# Patient Record
Sex: MALE | Race: WHITE | NOT HISPANIC OR LATINO | Employment: OTHER | URBAN - METROPOLITAN AREA
[De-identification: names, ages, dates, MRNs, and addresses within clinical notes are randomized per-mention and may not be internally consistent; named-entity substitution may affect disease eponyms.]

---

## 2017-02-02 ENCOUNTER — LAB VISIT (OUTPATIENT)
Dept: LAB | Facility: HOSPITAL | Age: 57
End: 2017-02-02
Attending: UROLOGY
Payer: COMMERCIAL

## 2017-02-02 DIAGNOSIS — N40.1 BPH (BENIGN PROSTATIC HYPERTROPHY) WITH URINARY OBSTRUCTION: ICD-10-CM

## 2017-02-02 DIAGNOSIS — N13.8 BPH (BENIGN PROSTATIC HYPERTROPHY) WITH URINARY OBSTRUCTION: ICD-10-CM

## 2017-02-02 LAB — COMPLEXED PSA SERPL-MCNC: 6.2 NG/ML

## 2017-02-02 PROCEDURE — 36415 COLL VENOUS BLD VENIPUNCTURE: CPT | Mod: PO

## 2017-02-02 PROCEDURE — 84153 ASSAY OF PSA TOTAL: CPT

## 2017-02-06 ENCOUNTER — OFFICE VISIT (OUTPATIENT)
Dept: UROLOGY | Facility: CLINIC | Age: 57
End: 2017-02-06
Payer: COMMERCIAL

## 2017-02-06 VITALS
HEIGHT: 77 IN | WEIGHT: 264.56 LBS | BODY MASS INDEX: 31.24 KG/M2 | SYSTOLIC BLOOD PRESSURE: 129 MMHG | HEART RATE: 45 BPM | DIASTOLIC BLOOD PRESSURE: 81 MMHG

## 2017-02-06 DIAGNOSIS — N13.8 BPH (BENIGN PROSTATIC HYPERTROPHY) WITH URINARY OBSTRUCTION: ICD-10-CM

## 2017-02-06 DIAGNOSIS — R97.20 ELEVATED PSA: Primary | ICD-10-CM

## 2017-02-06 DIAGNOSIS — N52.9 ED (ERECTILE DYSFUNCTION) OF ORGANIC ORIGIN: ICD-10-CM

## 2017-02-06 DIAGNOSIS — N40.1 BPH (BENIGN PROSTATIC HYPERTROPHY) WITH URINARY OBSTRUCTION: ICD-10-CM

## 2017-02-06 PROCEDURE — 99999 PR PBB SHADOW E&M-EST. PATIENT-LVL III: CPT | Mod: PBBFAC,,, | Performed by: UROLOGY

## 2017-02-06 PROCEDURE — 99214 OFFICE O/P EST MOD 30 MIN: CPT | Mod: S$GLB,,, | Performed by: UROLOGY

## 2017-02-06 NOTE — MR AVS SNAPSHOT
[place] [facility]   [facility]  [place] [address]  Phone: [phone]                  Dmitri Watt   2017 8:40 AM   Office Visit    Description:  Male : 1960   Provider:  Raheel Ochoa MD   Department:  [place] [facility]           Reason for Visit     Follow-up           Diagnoses this Visit        Comments    Elevated PSA    -  Primary     BPH (benign prostatic hypertrophy) with urinary obstruction         ED (erectile dysfunction) of organic origin                To Do List           Goals (5 Years of Data)     None      Follow-Up and Disposition     Return for MRI of prostate, PSA in 6 months if negative.      [facility]     [facility] On Call Nurse Care Line -  Assistance  Registered nurses in the [facility] On Call Center provide clinical advisement, health education, appointment booking, and other advisory services.  Call for this free service at [phone].             Medications           Message regarding Medications     Verify the changes and/or additions to your medication regime listed below are the same as discussed with your clinician today.  If any of these changes or additions are incorrect, please notify your healthcare provider.             Verify that the below list of medications is an accurate representation of the medications you are currently taking.  If none reported, the list may be blank. If incorrect, please contact your healthcare provider. Carry this list with you in case of emergency.           Current Medications     finasteride (PROSCAR) 5 mg tablet Take 1 tablet (5 mg total) by mouth once daily.    glucosamine-chondroitin 500-400 mg tablet Take 1 tablet by mouth 3 (three) times daily.    ibuprofen (ADVIL,MOTRIN) 100 MG tablet Take 100 mg by mouth every 6 (six) hours as needed.    tamsulosin (FLOMAX) 0.4 mg Cp24 Take 1 capsule (0.4 mg total) by mouth every evening.    VIAGRA 100 mg tablet TAKE 1 TABLET BY MOUTH EVERY DAY          "  Clinical Reference Information           Your Vitals Were     BP Pulse Height Weight BMI    129/81 45 6' 5" (1.956 m) 120 kg (264 lb 8.8 oz) 31.37 kg/m2      Blood Pressure          Most Recent Value    BP  129/81      Allergies as of 2/6/2017     No Known Drug Allergies      Immunizations Administered on Date of Encounter - 2/6/2017     None      Orders Placed During Today's Visit     Future Labs/Procedures Expected by Expires    MRI Pelvis W WO Contrast  2/6/2017 2/6/2018    Prostate Specific Antigen, Diagnostic  2/6/2017 2/6/2018      [facility] Sign-Up     Activating your [facility] account is as easy as 1-2-3!     1) Visit [email or web address], select Sign Up Now, enter this activation code and your date of birth, then select Next.  [email or web address]  Expires: 3/23/2017  9:21 AM      2) Create a username and password to use when you visit [facility] in the future and select a security question in case you lose your password and select Next.    3) Enter your e-mail address and click Sign Up!    Additional Information  If you have questions, please e-mail [email or web address] or call [phone] to talk to our [facility] staff. Remember, [facility] is NOT to be used for urgent needs. For medical emergencies, dial 911.         Language Assistance Services     ATTENTION: Language assistance services are available, free of charge. Please call [phone].      ATENCIÓN: Si habla español, tiene a valenzuela disposición servicios gratuitos de asistencia lingüística. Llame al [phone].     CHÚ Ý: N?u b?n nói Ti?ng Vi?t, có các d?ch v? h? tr? ngôn ng? mi?n phí dành cho b?n. G?i s? [phone].         [facility] complies with applicable Federal civil rights laws and does not discriminate on the basis of race, color, national origin, age, disability, or sex.        "

## 2017-02-06 NOTE — PROGRESS NOTES
CC: PSA follow up.    Dmitri Watt is a 56 y.o. man who is here for the evaluation of elevated PSA.    his severe constipation has improved from diet change.    s/p TRUS bx revealing no prostate cancer but enlarged prostate of 63 grams in size back in 4/2014.  Has been on Flomax and Finasteride since then.  Noticed improvement on his urinary symptoms more so lately since the medical trial.  However, he has experienced decreased sexual drive, erectile function and ejaculation disorder.  His wife underwent removal of ovary related to breast cancer and the couple experienced less frequent sexual encounter.  He used to use a small fragment of viagra to enhance his erectile function.  But now since using flomax and finasteride, he has to use much higher dose of viagra.    His friend was diagnosed with prostate cancer and was treated locally here in [place].  He is concerned about his elevated PSA.    Past Medical History   Diagnosis Date    Elevated PSA      Past Surgical History   Procedure Laterality Date    None       Social History   Substance Use Topics    Smoking status: Never Smoker    Smokeless tobacco: Never Used    Alcohol use Yes      Comment: socially     Family History   Problem Relation Age of Onset    Cancer Maternal Aunt      colon     Allergy:  Review of patient's allergies indicates:   Allergen Reactions    No known drug allergies      Outpatient Encounter Prescriptions as of 2/6/2017   Medication Sig Dispense Refill    finasteride (PROSCAR) 5 mg tablet Take 1 tablet (5 mg total) by mouth once daily. 90 tablet 3    glucosamine-chondroitin 500-400 mg tablet Take 1 tablet by mouth 3 (three) times daily.      ibuprofen (ADVIL,MOTRIN) 100 MG tablet Take 100 mg by mouth every 6 (six) hours as needed.      tamsulosin (FLOMAX) 0.4 mg Cp24 Take 1 capsule (0.4 mg total) by mouth every evening. 90 capsule 3    VIAGRA 100 mg tablet TAKE 1 TABLET BY MOUTH EVERY DAY 5 tablet 11     No  facility-administered encounter medications on file as of 2/6/2017.      Review of Systems   General ROS: GENERAL:  No weight gain or loss  SKIN:  No rashes or lacerations  HEAD:  No headaches  EYES:  No exophthalmos, jaundice or blindness  EARS:  No dizziness, tinnitus or hearing loss  NOSE:  No changes in smell  MOUTH & THROAT:  No dyskinetic movements or obvious goiter  CHEST:  No shortness of breath, hyperventilation or cough  CARDIOVASCULAR:  No tachycardia or chest pain  ABDOMEN:  No nausea, vomiting, pain, constipation or diarrhea  URINARY:  No frequency, dysuria or sexual dysfunction  ENDOCRINE:  No polydipsia, polyuria  MUSCULOSKELETAL:  No pain or stiffness of the joints  NEUROLOGIC:  No weakness, sensory changes, seizures, confusion, memory loss, tremor or other abnormal movements  Physical Exam     Vitals:    02/06/17 0857   BP: 129/81   Pulse: (!) 45     General Appearance:  Alert, cooperative, no distress, appears stated age   Head:  Normocephalic, without obvious abnormality, atraumatic   Eyes:  PERRL, conjunctiva/corneas clear, EOM's intact, fundi benign, both eyes   Ears:  Normal TM's and external ear canals, both ears   Nose: Nares normal, septum midline, mucosa normal, no drainage or sinus tenderness   Throat: Lips, mucosa, and tongue normal; teeth and gums normal   Neck: Supple, symmetrical, trachea midline, no adenopathy, thyroid: not enlarged, symmetric, no tenderness/mass/nodules, no carotid bruit or JVD   Back:   Symmetric, no curvature, ROM normal, no CVA tenderness   Lungs:   Clear to auscultation bilaterally, respirations unlabored   Chest Wall:  No tenderness or deformity   Heart:  Regular rate and rhythm, S1, S2 normal, no murmur, rub or gallop   Abdomen:   Soft, non-tender, bowel sounds active all four quadrants,  no masses, no organomegaly of liver and spleen  No hernia noted   Genitalia:  Scrotum: no rash or lesion  Normal symmetric epididymis without masses  Normal vas  palpated  Normal size, symmetric testicles with no masses   Normal urethral meatus with no discharge  Normal circumcised penis with no lesion   Rectal:  Normal perineum and anus upon inspection.  Normal tone, no masses or tenderness;   Extremities: Extremities normal, atraumatic, no cyanosis or edema   Pulses: 2+ and symmetric   Skin: Skin color, texture, turgor normal, no rashes or lesions   Lymph nodes: Cervical, supraclavicular, and axillary nodes normal   Neurologic: Normal     Prostate 45 grams smooth with no nodule or tenderness.    LABS:  Lab Results   Component Value Date    PSA 6.3 (H) 11/08/2013    PSA 4.81 (H) 05/21/2012    PSA 4.72 (H) 03/09/2012    PSA 5.33 (H) 12/21/2011    PSA 2.9 01/21/2009    PSADIAG 6.2 (H) 02/02/2017    PSADIAG 8.7 (H) 12/05/2016    PSADIAG 6.4 (H) 12/29/2015    PSADIAG 4.5 (H) 06/01/2015    PSADIAG 4.0 11/04/2014    PSADIAG 5.6 (H) 01/27/2014     Results for orders placed or performed in visit on 12/29/15   Prostate Specific Antigen, Diagnostic   Result Value Ref Range    PSA DIAGNOSTIC 6.4 (H) 0.00-4.00 ng/mL   Results for orders placed or performed in visit on 06/01/15   Prostate Specific Antigen, Diagnostic   Result Value Ref Range    PSA DIAGNOSTIC 4.5 (H) 0.00-4.00 ng/mL   Results for orders placed or performed in visit on 11/04/14   Prostate Specific Antigen, Diagnostic   Result Value Ref Range    PSA DIAGNOSTIC 4.0 0.00-4.00 ng/mL     Lab Results   Component Value Date    CREATININE 1.3 11/04/2014    CREATININE 1.5 (H) 11/08/2013    CREATININE 1.3 12/21/2011     Results for orders placed or performed in visit on 11/04/14   Testosterone   Result Value Ref Range    Testosterone, Total 575 195.0-1138.0 ng/dL     Assessment and Plan:  Dmitri was seen today for follow-up.    Diagnoses and all orders for this visit:    Elevated PSA  -     MRI Pelvis W WO Contrast; Future  -     Prostate Specific Antigen, Diagnostic; Future    BPH (benign prostatic hypertrophy) with urinary  obstruction    ED (erectile dysfunction) of organic origin    wll do MRI of pelvis with prostate protocol.  If abnormal, will arrange another TRUS bx of prostate.  If no significant abnormality shows, will follow up with serial PSA.  Continue flomax and finasteride.    I spent 25 minutes with the patient of which more than half was spent in direct consultation with the patient in regards to our treatment and plan.    Follow-up:  Return for MRI of prostate, PSA in 6 months if negative.

## 2017-02-14 ENCOUNTER — TELEPHONE (OUTPATIENT)
Dept: UROLOGY | Facility: CLINIC | Age: 57
End: 2017-02-14

## 2017-02-15 ENCOUNTER — HOSPITAL ENCOUNTER (OUTPATIENT)
Dept: RADIOLOGY | Facility: HOSPITAL | Age: 57
Discharge: HOME | End: 2017-02-15
Attending: UROLOGY
Payer: COMMERCIAL

## 2017-02-15 ENCOUNTER — TELEPHONE (OUTPATIENT)
Dept: UROLOGY | Facility: CLINIC | Age: 57
End: 2017-02-15

## 2017-02-15 DIAGNOSIS — R97.20 ELEVATED PSA: Primary | ICD-10-CM

## 2017-02-15 DIAGNOSIS — R97.20 ELEVATED PSA: ICD-10-CM

## 2017-02-15 PROCEDURE — 72197 MRI PELVIS W/O & W/DYE: CPT | Mod: 26,GC,, | Performed by: RADIOLOGY

## 2017-02-15 PROCEDURE — 25500020 PHARM REV CODE 255: Performed by: UROLOGY

## 2017-02-15 PROCEDURE — 72197 MRI PELVIS W/O & W/DYE: CPT | Mod: TC

## 2017-02-15 PROCEDURE — A9585 GADOBUTROL INJECTION: HCPCS | Performed by: UROLOGY

## 2017-02-15 RX ORDER — GADOBUTROL 604.72 MG/ML
10 INJECTION INTRAVENOUS
Status: COMPLETED | OUTPATIENT
Start: 2017-02-15 | End: 2017-02-15

## 2017-02-15 RX ADMIN — GADOBUTROL 10 ML: 604.72 INJECTION INTRAVENOUS at 10:02

## 2017-02-15 NOTE — TELEPHONE ENCOUNTER
Lab Results   Component Value Date    PSA 6.3 (H) 11/08/2013    PSA 4.81 (H) 05/21/2012    PSA 4.72 (H) 03/09/2012    PSADIAG 6.2 (H) 02/02/2017    PSADIAG 8.7 (H) 12/05/2016    PSADIAG 6.4 (H) 12/29/2015     Had TRUS bx of prostate on 4/10/14 which showed no cancer.    MRI 2/15/1  No evidence of discrete prostate lesion.    After discussion, we decided to follow up with serial PSA in 6 months.  Elevated PSA  -     Prostate Specific Antigen, Diagnostic; Future; Expected date: 2/15/17    please have him follow up with me in 6 months with PSA.

## 2017-02-15 NOTE — TELEPHONE ENCOUNTER
After discussion, we decided to follow up with serial PSA in 6 months.  Elevated PSA  - Prostate Specific Antigen, Diagnostic; Future; Expected date: 2/15/17     please have him follow up with me in 6 months with PSA.

## 2017-05-01 RX ORDER — SILDENAFIL 100 MG/1
100 TABLET, FILM COATED ORAL DAILY
Qty: 5 TABLET | Refills: 11 | Status: SHIPPED | OUTPATIENT
Start: 2017-05-01 | End: 2018-08-07 | Stop reason: SDUPTHER

## 2017-05-01 NOTE — TELEPHONE ENCOUNTER
----- Message from Medina Grant MA sent at 5/1/2017  1:22 PM CDT -----  Contact: self  [phone]  States he needs a new script for viagra called to [facility] at [phone].    Call when done.

## 2017-06-21 ENCOUNTER — OFFICE VISIT (OUTPATIENT)
Dept: INTERNAL MEDICINE | Facility: CLINIC | Age: 57
End: 2017-06-21
Payer: COMMERCIAL

## 2017-06-21 VITALS
HEIGHT: 77 IN | DIASTOLIC BLOOD PRESSURE: 84 MMHG | HEART RATE: 52 BPM | SYSTOLIC BLOOD PRESSURE: 120 MMHG | WEIGHT: 258.63 LBS | BODY MASS INDEX: 30.54 KG/M2 | TEMPERATURE: 98 F

## 2017-06-21 DIAGNOSIS — Z00.00 ENCOUNTER FOR PREVENTIVE HEALTH EXAMINATION: Primary | ICD-10-CM

## 2017-06-21 DIAGNOSIS — N13.8 BPH (BENIGN PROSTATIC HYPERTROPHY) WITH URINARY OBSTRUCTION: ICD-10-CM

## 2017-06-21 DIAGNOSIS — N40.1 BPH (BENIGN PROSTATIC HYPERTROPHY) WITH URINARY OBSTRUCTION: ICD-10-CM

## 2017-06-21 DIAGNOSIS — R13.10 DYSPHAGIA, UNSPECIFIED TYPE: ICD-10-CM

## 2017-06-21 PROCEDURE — 90471 IMMUNIZATION ADMIN: CPT | Mod: S$GLB,,, | Performed by: INTERNAL MEDICINE

## 2017-06-21 PROCEDURE — 99999 PR PBB SHADOW E&M-EST. PATIENT-LVL III: CPT | Mod: PBBFAC,,, | Performed by: INTERNAL MEDICINE

## 2017-06-21 PROCEDURE — 99386 PREV VISIT NEW AGE 40-64: CPT | Mod: 25,S$GLB,, | Performed by: INTERNAL MEDICINE

## 2017-06-21 PROCEDURE — 90714 TD VACC NO PRESV 7 YRS+ IM: CPT | Mod: S$GLB,,, | Performed by: INTERNAL MEDICINE

## 2017-06-21 RX ORDER — RABEPRAZOLE SODIUM 20 MG/1
TABLET, DELAYED RELEASE ORAL
COMMUNITY
Start: 2017-06-12 | End: 2017-06-21

## 2017-06-21 NOTE — PROGRESS NOTES
History of present illness:  57-year-old male in today for general health assessment.    Current medications:  Finasteride, Flomax.    Review of systems:  General: no fever, chills, generalized body aches. No unexpected weight loss.  Eyes:  No visual disturbances.  HEENT:  No hoarseness,  ear pain.  The patient describes 1 episode recently where he uses eating at a restaurant and a piece of shrimp in [place] his upper throat.  He does not have any trouble breathing talked or other after some time he did cough it up.  Otherwise he has not had any issues of dysphagia noted reflux symptomatology, no change in bowel habits no nausea no vomiting.  No hoarseness no change in voice.  Respiratory:  No cough, no shortness of breath.  Cardiovascular: no chest pain, palpitations, cough, exertional limb pain. No edema.  GI: no nausea, vomiting.  No abdominal pain. No change in bowel habits.  No melena, no hematochezia.  : no dysuria. No change in the color or character of the urine.  Followed regularly by urology for his BPH and elevated PSA..  Musculoskeletal: no joint pain or swelling.  Neurologic:  No focal neurological complaints.  No headaches.  Skin:  No rashes or other concerns.  Psych:  No emotional issues    Past medical history, past surgical history, family medical history and social history noted reviewed the electronic medical record history sections.    Health screenings:  Colonoscopy 2013.  Tetanus immunization 2005.    Physical examination:  GENERAL:  Alert, appropriately groomed, no acute distress.  VS: Blood pressure taken manually is 120/84.  EYES: sclerae white ,nonicteric. PERRL.  HEENT:  Normocephalic. Ear canals and tympanic membranes normal. Mouth and pharynx normal. No thyromegaly. Trachea midline and freely mobile.  LUNGS:  Clear to ascultation and normal to percussion.  CARDIOVASCULAR:  Normal heart sounds.  No significant murmur. Carotids full bilaterally without bruit.  Pedal pulses intact .  No  abdominal bruit.  No peripheral extremity edema.  GI: the abdomen is soft, no distension. No masses , tenderness, organomegaly.    : Deferred in view of regular urology evaluations.  LYMPHATIC:  No axillary, inguinal , cervical adenopathy.  MUSCULOSKELETAL:  Range of motion, stability and strength of the right and left upper and lower extremities normal. No swollen or tender joints  NEUROLOGIC:  DTR's normal. No gross motor or sensory deficits apparent, gait normal.  SKIN:  No rashes.   MS:  Alert, oriented , affect and mood all appropriate    Impression:  Generally healthy 57-year-old male living a reasonably healthy lifestyle.  He does have a history of BPH and elevated PSA and is followed regularly by urology.  One episode of dysphasia with food delay.  Recent insurance physical was performed elsewhere in the patient states she was told that his A1c was slightly elevated.    Plan:  Update health maintenance laboratory data to include a glycohemoglobin, CBC, chemistry profile, lipid profile, TSH.  Barium swallow study.  Tetanus immunization update with Td.

## 2017-06-26 ENCOUNTER — HOSPITAL ENCOUNTER (OUTPATIENT)
Dept: RADIOLOGY | Facility: HOSPITAL | Age: 57
Discharge: HOME | End: 2017-06-26
Attending: INTERNAL MEDICINE
Payer: COMMERCIAL

## 2017-06-26 DIAGNOSIS — R13.10 DYSPHAGIA, UNSPECIFIED TYPE: ICD-10-CM

## 2017-06-26 PROCEDURE — 74220 X-RAY XM ESOPHAGUS 1CNTRST: CPT | Mod: TC

## 2017-06-26 PROCEDURE — 74220 X-RAY XM ESOPHAGUS 1CNTRST: CPT | Mod: 26,,, | Performed by: RADIOLOGY

## 2017-07-18 ENCOUNTER — TELEPHONE (OUTPATIENT)
Dept: INTERNAL MEDICINE | Facility: CLINIC | Age: 57
End: 2017-07-18

## 2017-07-18 NOTE — TELEPHONE ENCOUNTER
Labs ok other than chol. It is at a  level that meets our guidelines for starting medical tx to lower his cardiovascular risk.  If he is agreeable we will send in and repeat labs in 3 mos

## 2017-07-18 NOTE — TELEPHONE ENCOUNTER
----- Message from Becky Gonzalez sent at 7/18/2017  3:04 PM CDT -----  Contact: self   Patient called would like to know if needed to come in for f/u on lab visit from 6/26/17    Please advise

## 2017-07-20 ENCOUNTER — LAB VISIT (OUTPATIENT)
Dept: LAB | Facility: HOSPITAL | Age: 57
End: 2017-07-20
Attending: UROLOGY
Payer: COMMERCIAL

## 2017-07-20 DIAGNOSIS — R97.20 ELEVATED PSA: ICD-10-CM

## 2017-07-20 LAB — COMPLEXED PSA SERPL-MCNC: 8.3 NG/ML

## 2017-07-20 PROCEDURE — 84153 ASSAY OF PSA TOTAL: CPT

## 2017-07-20 PROCEDURE — 36415 COLL VENOUS BLD VENIPUNCTURE: CPT | Mod: PO

## 2017-07-24 ENCOUNTER — OFFICE VISIT (OUTPATIENT)
Dept: UROLOGY | Facility: CLINIC | Age: 57
End: 2017-07-24
Payer: COMMERCIAL

## 2017-07-24 VITALS
HEIGHT: 77 IN | WEIGHT: 258.81 LBS | DIASTOLIC BLOOD PRESSURE: 78 MMHG | HEART RATE: 44 BPM | BODY MASS INDEX: 30.56 KG/M2 | SYSTOLIC BLOOD PRESSURE: 122 MMHG

## 2017-07-24 DIAGNOSIS — N13.8 BENIGN PROSTATIC HYPERPLASIA WITH URINARY OBSTRUCTION: ICD-10-CM

## 2017-07-24 DIAGNOSIS — N40.1 BENIGN PROSTATIC HYPERPLASIA WITH URINARY OBSTRUCTION: ICD-10-CM

## 2017-07-24 DIAGNOSIS — R97.20 ELEVATED PSA: Primary | ICD-10-CM

## 2017-07-24 PROCEDURE — 99999 PR PBB SHADOW E&M-EST. PATIENT-LVL III: CPT | Mod: PBBFAC,,, | Performed by: UROLOGY

## 2017-07-24 PROCEDURE — 99214 OFFICE O/P EST MOD 30 MIN: CPT | Mod: S$GLB,,, | Performed by: UROLOGY

## 2017-07-24 NOTE — PROGRESS NOTES
CC: PSA follow up.  CC: elevated PSA.    Dmitri Watt is a 57 y.o. man who is here for the evaluation of elevated PSA.    Had TRUS bx of prostate on 4/10/14 which showed no cancer.  When he had elevated PSA 8.7 in 12/2016, we decided to further evaluate him with MRI and a repeat PSA.  His PSA came down to 6.2 in 2/1027 after he was abstinent from sex prior to his PSA blood draw.  MRI 2/15/17  No evidence of discrete prostate lesion.    Thus so far we decided to follow him with a serial PSA.  His friend was diagnosed with prostate cancer and was treated locally here in [place].  He is concerned about his elevated PSA.    s/p TRUS bx revealing no prostate cancer but enlarged prostate of 63 grams in size back in 4/2014.  Has been on Flomax and Finasteride since then.  Noticed improvement on his urinary symptoms more so lately since the medical trial.  However, he has experienced decreased sexual drive, erectile function and ejaculation disorder.  His wife underwent removal of ovary related to breast cancer and the couple experienced less frequent sexual encounter.  He used to use a small fragment of viagra to enhance his erectile function.  But now since using flomax and finasteride, he has to use much higher dose of viagra.    Past Medical History:   Diagnosis Date    Elevated PSA      Past Surgical History:   Procedure Laterality Date    none       Social History   Substance Use Topics    Smoking status: Never Smoker    Smokeless tobacco: Never Used    Alcohol use Yes      Comment: socially     Family History   Problem Relation Age of Onset    Cancer Maternal Aunt      colon     Allergy:  Review of patient's allergies indicates:   Allergen Reactions    No known drug allergies      Outpatient Encounter Prescriptions as of 7/24/2017   Medication Sig Dispense Refill    finasteride (PROSCAR) 5 mg tablet Take 1 tablet (5 mg total) by mouth once daily. 90 tablet 3    glucosamine-chondroitin 500-400 mg  tablet Take 1 tablet by mouth 3 (three) times daily.      ibuprofen (ADVIL,MOTRIN) 100 MG tablet Take 100 mg by mouth every 6 (six) hours as needed.      sildenafil (VIAGRA) 100 MG tablet Take 1 tablet (100 mg total) by mouth once daily. 5 tablet 11    tamsulosin (FLOMAX) 0.4 mg Cp24 Take 1 capsule (0.4 mg total) by mouth every evening. 90 capsule 3     No facility-administered encounter medications on file as of 7/24/2017.      Review of Systems   General ROS: GENERAL:  No weight gain or loss  SKIN:  No rashes or lacerations  HEAD:  No headaches  EYES:  No exophthalmos, jaundice or blindness  EARS:  No dizziness, tinnitus or hearing loss  NOSE:  No changes in smell  MOUTH & THROAT:  No dyskinetic movements or obvious goiter  CHEST:  No shortness of breath, hyperventilation or cough  CARDIOVASCULAR:  No tachycardia or chest pain  ABDOMEN:  No nausea, vomiting, pain, constipation or diarrhea  URINARY:  No frequency, dysuria or sexual dysfunction  ENDOCRINE:  No polydipsia, polyuria  MUSCULOSKELETAL:  No pain or stiffness of the joints  NEUROLOGIC:  No weakness, sensory changes, seizures, confusion, memory loss, tremor or other abnormal movements  Physical Exam     Vitals:    07/24/17 0908   BP: 122/78   Pulse: (!) 44     General Appearance:  Alert, cooperative, no distress, appears stated age   Head:  Normocephalic, without obvious abnormality, atraumatic   Eyes:  PERRL, conjunctiva/corneas clear, EOM's intact, fundi benign, both eyes   Ears:  Normal TM's and external ear canals, both ears   Nose: Nares normal, septum midline, mucosa normal, no drainage or sinus tenderness   Throat: Lips, mucosa, and tongue normal; teeth and gums normal   Neck: Supple, symmetrical, trachea midline, no adenopathy, thyroid: not enlarged, symmetric, no tenderness/mass/nodules, no carotid bruit or JVD   Back:   Symmetric, no curvature, ROM normal, no CVA tenderness   Lungs:   Clear to auscultation bilaterally, respirations unlabored    Chest Wall:  No tenderness or deformity   Heart:  Regular rate and rhythm, S1, S2 normal, no murmur, rub or gallop   Abdomen:   Soft, non-tender, bowel sounds active all four quadrants,  no masses, no organomegaly of liver and spleen  No hernia noted   Genitalia:  Scrotum: no rash or lesion  Normal symmetric epididymis without masses  Normal vas palpated  Normal size, symmetric testicles with no masses   Normal urethral meatus with no discharge  Normal circumcised penis with no lesion   Rectal:  Normal perineum and anus upon inspection.  Normal tone, no masses or tenderness;   Extremities: Extremities normal, atraumatic, no cyanosis or edema   Pulses: 2+ and symmetric   Skin: Skin color, texture, turgor normal, no rashes or lesions   Lymph nodes: Cervical, supraclavicular, and axillary nodes normal   Neurologic: Normal     Prostate 45 grams smooth with no nodule or tenderness.    LABS:  Lab Results   Component Value Date    PSA 6.3 (H) 11/08/2013    PSA 4.81 (H) 05/21/2012    PSA 4.72 (H) 03/09/2012    PSA 5.33 (H) 12/21/2011    PSA 2.9 01/21/2009    PSADIAG 8.3 (H) 07/20/2017    PSADIAG 6.2 (H) 02/02/2017    PSADIAG 8.7 (H) 12/05/2016    PSADIAG 6.4 (H) 12/29/2015    PSADIAG 4.5 (H) 06/01/2015    PSADIAG 4.0 11/04/2014    PSADIAG 5.6 (H) 01/27/2014     Results for orders placed or performed in visit on 12/29/15   Prostate Specific Antigen, Diagnostic   Result Value Ref Range    PSA DIAGNOSTIC 6.4 (H) 0.00-4.00 ng/mL   Results for orders placed or performed in visit on 06/01/15   Prostate Specific Antigen, Diagnostic   Result Value Ref Range    PSA DIAGNOSTIC 4.5 (H) 0.00-4.00 ng/mL   Results for orders placed or performed in visit on 11/04/14   Prostate Specific Antigen, Diagnostic   Result Value Ref Range    PSA DIAGNOSTIC 4.0 0.00-4.00 ng/mL     Lab Results   Component Value Date    CREATININE 1.3 06/26/2017    CREATININE 1.3 11/04/2014    CREATININE 1.5 (H) 11/08/2013     Results for orders placed or performed  in visit on 11/04/14   Testosterone   Result Value Ref Range    Testosterone, Total 575 195.0-1138.0 ng/dL     Assessment and Plan:  Dmitri was seen today for follow-up.    Diagnoses and all orders for this visit:    Elevated PSA    Benign prostatic hyperplasia with urinary obstruction    again his PSA went up when he was not abstinent from sex prior to his PSA draw.  He wants to repeat a PSA with abstinence and see how it affect his PSA.  If it continues to rise, may repeat a TRSU bx of prostate cancer.    If no significant abnormality shows, will follow up with serial PSA.  Continue flomax and finasteride.    I spent 25 minutes with the patient of which more than half was spent in direct consultation with the patient in regards to our treatment and plan.      Follow-up:  Return in about 3 months (around 10/24/2017) for PSA.

## 2017-07-24 NOTE — PATIENT INSTRUCTIONS
Lab Results   Component Value Date    PSA 6.3 (H) 11/08/2013    PSA 4.81 (H) 05/21/2012    PSA 4.72 (H) 03/09/2012    PSADIAG 8.3 (H) 07/20/2017    PSADIAG 6.2 (H) 02/02/2017    PSADIAG 8.7 (H) 12/05/2016

## 2017-09-08 ENCOUNTER — OFFICE VISIT (OUTPATIENT)
Dept: INTERNAL MEDICINE | Facility: CLINIC | Age: 57
End: 2017-09-08
Payer: COMMERCIAL

## 2017-09-08 VITALS
TEMPERATURE: 98 F | DIASTOLIC BLOOD PRESSURE: 79 MMHG | HEART RATE: 53 BPM | BODY MASS INDEX: 31.03 KG/M2 | WEIGHT: 262.81 LBS | RESPIRATION RATE: 16 BRPM | HEIGHT: 77 IN | SYSTOLIC BLOOD PRESSURE: 133 MMHG

## 2017-09-08 DIAGNOSIS — E78.5 DYSLIPIDEMIA: Primary | ICD-10-CM

## 2017-09-08 DIAGNOSIS — B35.1 ONYCHOMYCOSIS: ICD-10-CM

## 2017-09-08 PROCEDURE — 99999 PR PBB SHADOW E&M-EST. PATIENT-LVL III: CPT | Mod: PBBFAC,,, | Performed by: INTERNAL MEDICINE

## 2017-09-08 PROCEDURE — 3008F BODY MASS INDEX DOCD: CPT | Mod: S$GLB,,, | Performed by: INTERNAL MEDICINE

## 2017-09-08 PROCEDURE — 99214 OFFICE O/P EST MOD 30 MIN: CPT | Mod: S$GLB,,, | Performed by: INTERNAL MEDICINE

## 2017-09-08 RX ORDER — TERBINAFINE HYDROCHLORIDE 250 MG/1
250 TABLET ORAL DAILY
Qty: 30 TABLET | Refills: 2 | Status: SHIPPED | OUTPATIENT
Start: 2017-09-08 | End: 2017-10-08

## 2017-09-11 PROBLEM — B35.1 ONYCHOMYCOSIS: Status: ACTIVE | Noted: 2017-09-11

## 2017-09-11 PROBLEM — E78.5 DYSLIPIDEMIA: Status: ACTIVE | Noted: 2017-09-11

## 2017-09-12 NOTE — PROGRESS NOTES
This office note has been dictated.  HISTORY:  This is a 57-year-old gentleman following up on blood pressure recheck   and lab review.  We also discussed his desire to treat his onychomycosis with   oral antifungal agents.  At our most recent last visit, blood pressure was   border line.  Currently, he feels well with no new issues.    CURRENT MEDICATIONS:  Noted and reviewed in the electronic medical record   medication list and include finasteride, tamsulosin.    REVIEW OF SYSTEMS:  HEENT:  No hoarseness.  No dysphagia.  No earache.  No hearing issues.  CARDIOVASCULAR:  No chest pain.  No palpitations.  No syncope.  RESPIRATORY:  No cough.  No shortness of breath.  GASTROINTESTINAL:  No nausea or vomiting.  No abdominal pain.  No diarrhea.  No   change in bowel habits.    PAST MEDICAL HISTORY, PAST SURGICAL HISTORY, FAMILY AND SOCIAL HISTORY:  All   noted and reviewed in the electronic medical record history section.    PHYSICAL EXAMINATION:  GENERAL:  Alert male in no acute distress.  VITAL SIGNS:  All noted and reviewed as normal.  CARDIOVASCULAR:  Regular rate and rhythm.  No significant murmur.  LUNGS:  Clear to auscultation.  SKIN:  Onychomycosis to great toenails.    LABORATORY DATA:  Reviewed recent lab data.  Dyslipidemia with 10-year   cardiovascular risk assessment of 8%.    IMPRESSION:  1.  Normotensive.  2.  Onychomycosis.  3.  Dyslipidemia, 10-year cardiovascular risk assessment of 8%.    PLAN:  1.  We will plan initiating statin therapy, but we will wait until he finishes   his three-month course of antifungal agents.  2.  Begin Lamisil 250 mg daily for an anticipated three-month course.  3.  ALT will be checked at monthly intervals.      SARI  dd: 09/11/2017 22:36:00 (CDT)  td: 09/12/2017 12:28:41 (CDT)  Doc ID   #[id number]  Job ID #[id number]    CC:

## 2017-10-09 ENCOUNTER — LAB VISIT (OUTPATIENT)
Dept: LAB | Facility: HOSPITAL | Age: 57
End: 2017-10-09
Attending: INTERNAL MEDICINE
Payer: COMMERCIAL

## 2017-10-09 DIAGNOSIS — B35.1 ONYCHOMYCOSIS: ICD-10-CM

## 2017-10-09 LAB — ALT SERPL W/O P-5'-P-CCNC: 21 U/L

## 2017-10-09 PROCEDURE — 84460 ALANINE AMINO (ALT) (SGPT): CPT

## 2017-10-09 PROCEDURE — 36415 COLL VENOUS BLD VENIPUNCTURE: CPT | Mod: PO

## 2017-10-13 NOTE — TELEPHONE ENCOUNTER
----- Message from Raheel Ochoa MD sent at 2/14/2017  4:21 PM CST -----  Contact: PT  Recommend to follow up with his PCP as you suggested.  ----- Message -----     From: Purnima Pena LPN     Sent: 2/14/2017   1:36 PM       To: Raheel Ochoa MD     Patient states his mother was just diagnosed with colon cancer and he is very concerned. He is scheduled for prostate mri tomorrow and wanted to know if you can include the colon. i told him that it is prob too late and we would need separate orders, approval, etc. i advised him to call colon and rectal, but that i would let you know. He will also call his PCP  ----- Message -----     From: Ashwini Santiago     Sent: 2/14/2017  11:38 AM       To: Don ELLER Staff    Would like someone to call him, regarding his MRI tomorrow. He would like a call today.     Call: [phone]        Bed: P2  Expected date:   Expected time:   Means of arrival:   Comments:  First response - Breast CA, SOB

## 2017-10-19 ENCOUNTER — LAB VISIT (OUTPATIENT)
Dept: LAB | Facility: HOSPITAL | Age: 57
End: 2017-10-19
Attending: UROLOGY
Payer: COMMERCIAL

## 2017-10-19 DIAGNOSIS — R97.20 ELEVATED PSA: ICD-10-CM

## 2017-10-19 LAB — COMPLEXED PSA SERPL-MCNC: 8.3 NG/ML

## 2017-10-19 PROCEDURE — 84153 ASSAY OF PSA TOTAL: CPT

## 2017-10-19 PROCEDURE — 36415 COLL VENOUS BLD VENIPUNCTURE: CPT | Mod: PO

## 2017-10-23 ENCOUNTER — OFFICE VISIT (OUTPATIENT)
Dept: UROLOGY | Facility: CLINIC | Age: 57
End: 2017-10-23
Payer: COMMERCIAL

## 2017-10-23 VITALS — WEIGHT: 263.25 LBS | RESPIRATION RATE: 18 BRPM | HEIGHT: 77 IN | BODY MASS INDEX: 31.08 KG/M2

## 2017-10-23 DIAGNOSIS — R97.20 ELEVATED PSA: Primary | ICD-10-CM

## 2017-10-23 PROCEDURE — 99999 PR PBB SHADOW E&M-EST. PATIENT-LVL III: CPT | Mod: PBBFAC,,, | Performed by: UROLOGY

## 2017-10-23 PROCEDURE — 99214 OFFICE O/P EST MOD 30 MIN: CPT | Mod: S$GLB,,, | Performed by: UROLOGY

## 2017-10-23 RX ORDER — LIDOCAINE HYDROCHLORIDE 10 MG/ML
10 INJECTION INFILTRATION; PERINEURAL ONCE
Status: CANCELLED | OUTPATIENT
Start: 2017-10-23 | End: 2017-10-23

## 2017-10-23 RX ORDER — LIDOCAINE HYDROCHLORIDE 20 MG/ML
JELLY TOPICAL ONCE
Status: CANCELLED | OUTPATIENT
Start: 2017-10-23 | End: 2017-10-23

## 2017-10-23 RX ORDER — CIPROFLOXACIN 500 MG/1
500 TABLET ORAL 2 TIMES DAILY
Qty: 6 TABLET | Refills: 0 | Status: SHIPPED | OUTPATIENT
Start: 2017-10-23 | End: 2017-10-26

## 2017-10-23 RX ORDER — GENTAMICIN SULFATE 40 MG/ML
160 INJECTION, SOLUTION INTRAMUSCULAR; INTRAVENOUS ONCE
Status: DISCONTINUED | OUTPATIENT
Start: 2017-10-23 | End: 2021-02-03

## 2017-10-23 NOTE — PROGRESS NOTES
CC: elevated PSA.    Dmitri Watt is a 57 y.o. man who is here for the evaluation of elevated PSA.    s/p TRUS bx revealing no prostate cancer but enlarged prostate of 63 grams in size back in 4/2014.  Has been on Flomax and Finasteride since then.  Noticed improvement on his urinary symptoms more so lately since the medical trial.  However, he has experienced decreased sexual drive, erectile function and ejaculation disorder.  His wife underwent removal of ovary related to breast cancer and the couple experienced less frequent sexual encounter.  He used to use a small fragment of viagra to enhance his erectile function.  But now since using flomax and finasteride, he has to use much higher dose of viagra.    When he had elevated PSA 8.7 in 12/2016, we decided to further evaluate him with MRI and a repeat PSA.  His PSA came down to 6.2 in 2/1027 after he was abstinent from sex prior to his PSA blood draw.  MRI 2/15/17  No evidence of discrete prostate lesion.    Thus so far we decided to follow him with a serial PSA.  His friend was diagnosed with prostate cancer and was treated locally here in [place].  He is concerned about his elevated PSA.    Past Medical History:   Diagnosis Date    Elevated PSA      Past Surgical History:   Procedure Laterality Date    none       Social History   Substance Use Topics    Smoking status: Never Smoker    Smokeless tobacco: Never Used    Alcohol use Yes      Comment: socially     Family History   Problem Relation Age of Onset    Cancer Maternal Aunt      colon     Allergy:  Review of patient's allergies indicates:   Allergen Reactions    No known drug allergies      Outpatient Encounter Prescriptions as of 10/23/2017   Medication Sig Dispense Refill    finasteride (PROSCAR) 5 mg tablet Take 1 tablet (5 mg total) by mouth once daily. 90 tablet 3    glucosamine-chondroitin 500-400 mg tablet Take 1 tablet by mouth 3 (three) times daily.      ibuprofen (ADVIL,MOTRIN)  100 MG tablet Take 100 mg by mouth every 6 (six) hours as needed.      sildenafil (VIAGRA) 100 MG tablet Take 1 tablet (100 mg total) by mouth once daily. 5 tablet 11    tamsulosin (FLOMAX) 0.4 mg Cp24 Take 1 capsule (0.4 mg total) by mouth every evening. 90 capsule 3    ciprofloxacin HCl (CIPRO) 500 MG tablet Take 1 tablet (500 mg total) by mouth 2 (two) times daily. 6 tablet 0    sodium phosphates (FLEET ENEMA) 19-7 gram/118 mL Enem Place 1 enema rectally once. 1 enema 1     Facility-Administered Encounter Medications as of 10/23/2017   Medication Dose Route Frequency Provider Last Rate Last Dose    gentamicin injection 160 mg  160 mg Intramuscular Once Raheel H. MD Don         Review of Systems   General ROS: GENERAL:  No weight gain or loss  SKIN:  No rashes or lacerations  HEAD:  No headaches  EYES:  No exophthalmos, jaundice or blindness  EARS:  No dizziness, tinnitus or hearing loss  NOSE:  No changes in smell  MOUTH & THROAT:  No dyskinetic movements or obvious goiter  CHEST:  No shortness of breath, hyperventilation or cough  CARDIOVASCULAR:  No tachycardia or chest pain  ABDOMEN:  No nausea, vomiting, pain, constipation or diarrhea  URINARY:  No frequency, dysuria or sexual dysfunction  ENDOCRINE:  No polydipsia, polyuria  MUSCULOSKELETAL:  No pain or stiffness of the joints  NEUROLOGIC:  No weakness, sensory changes, seizures, confusion, memory loss, tremor or other abnormal movements  Physical Exam     Vitals:    10/23/17 0853   Resp: 18     General Appearance:  Alert, cooperative, no distress, appears stated age   Head:  Normocephalic, without obvious abnormality, atraumatic   Eyes:  PERRL, conjunctiva/corneas clear, EOM's intact, fundi benign, both eyes   Ears:  Normal TM's and external ear canals, both ears   Nose: Nares normal, septum midline, mucosa normal, no drainage or sinus tenderness   Throat: Lips, mucosa, and tongue normal; teeth and gums normal   Neck: Supple, symmetrical, trachea  midline, no adenopathy, thyroid: not enlarged, symmetric, no tenderness/mass/nodules, no carotid bruit or JVD   Back:   Symmetric, no curvature, ROM normal, no CVA tenderness   Lungs:   Clear to auscultation bilaterally, respirations unlabored   Chest Wall:  No tenderness or deformity   Heart:  Regular rate and rhythm, S1, S2 normal, no murmur, rub or gallop   Abdomen:   Soft, non-tender, bowel sounds active all four quadrants,  no masses, no organomegaly of liver and spleen  No hernia noted   Genitalia:  Scrotum: no rash or lesion  Normal symmetric epididymis without masses  Normal vas palpated  Normal size, symmetric testicles with no masses   Normal urethral meatus with no discharge  Normal circumcised penis with no lesion   Rectal:  Normal perineum and anus upon inspection.  Normal tone, no masses or tenderness;   Extremities: Extremities normal, atraumatic, no cyanosis or edema   Pulses: 2+ and symmetric   Skin: Skin color, texture, turgor normal, no rashes or lesions   Lymph nodes: Cervical, supraclavicular, and axillary nodes normal   Neurologic: Normal     Prostate 45 grams smooth with no nodule or tenderness.    LABS:  Lab Results   Component Value Date    PSA 6.3 (H) 11/08/2013    PSA 4.81 (H) 05/21/2012    PSA 4.72 (H) 03/09/2012    PSA 5.33 (H) 12/21/2011    PSA 2.9 01/21/2009    PSADIAG 8.3 (H) 10/19/2017    PSADIAG 8.3 (H) 07/20/2017    PSADIAG 6.2 (H) 02/02/2017    PSADIAG 8.7 (H) 12/05/2016    PSADIAG 6.4 (H) 12/29/2015    PSADIAG 4.5 (H) 06/01/2015    PSADIAG 4.0 11/04/2014    PSADIAG 5.6 (H) 01/27/2014     Results for orders placed or performed in visit on 12/29/15   Prostate Specific Antigen, Diagnostic   Result Value Ref Range    PSA DIAGNOSTIC 6.4 (H) 0.00-4.00 ng/mL   Results for orders placed or performed in visit on 06/01/15   Prostate Specific Antigen, Diagnostic   Result Value Ref Range    PSA DIAGNOSTIC 4.5 (H) 0.00-4.00 ng/mL   Results for orders placed or performed in visit on 11/04/14    Prostate Specific Antigen, Diagnostic   Result Value Ref Range    PSA DIAGNOSTIC 4.0 0.00-4.00 ng/mL     Lab Results   Component Value Date    CREATININE 1.3 06/26/2017    CREATININE 1.3 11/04/2014    CREATININE 1.5 (H) 11/08/2013     Results for orders placed or performed in visit on 11/04/14   Testosterone   Result Value Ref Range    Testosterone, Total 575 195.0-1138.0 ng/dL     MRI 2/15/17  The prostate measures 4.7 x 4.5 x 4.8 cm with mild heterogeneity within the central gland.  No focal masses are identified.  There is no evidence of restricted diffusion within the prostate.     Assessment and Plan:  Dmitri was seen today for elevated psa.    Diagnoses and all orders for this visit:    Elevated PSA  -     Cancel: Transrectal Ultrasound w/ Biopsy; Future  -     Tissue Specimen To Pathology, Urology; Standing  -     Transrectal Ultrasound w/ Biopsy; Future  -     Tissue Specimen To Pathology, Urology; Standing    Other orders  -     lidocaine HCL 2% jelly; Apply topically once.  -     lidocaine HCL 10 mg/ml (1%) injection 10 mL; 10 mLs by Infiltration route once.  -     ciprofloxacin HCl (CIPRO) 500 MG tablet; Take 1 tablet (500 mg total) by mouth 2 (two) times daily.  -     sodium phosphates (FLEET ENEMA) 19-7 gram/118 mL Enem; Place 1 enema rectally once.  -     lidocaine HCL 2% jelly; Apply topically once.  -     lidocaine HCL 10 mg/ml (1%) injection 10 mL; 10 mLs by Infiltration route once.  -     gentamicin injection 160 mg; Inject 160 mg into the muscle once.      Although there was no significant abnormality showed in his MRI, I will repeat his TRUS bx of prostate since he is still very concerned about possible cancer.  Will plan biopsy the transitional zone as well.    Continue flomax and finasteride.    I spent 25 minutes with the patient of which more than half was spent in direct consultation with the patient in regards to our treatment and plan.      Follow-up:  Return for TRUS bx of  prostate.

## 2017-11-08 ENCOUNTER — LAB VISIT (OUTPATIENT)
Dept: LAB | Facility: HOSPITAL | Age: 57
End: 2017-11-08
Attending: INTERNAL MEDICINE
Payer: COMMERCIAL

## 2017-11-08 DIAGNOSIS — B35.1 ONYCHOMYCOSIS: ICD-10-CM

## 2017-11-08 LAB — ALT SERPL W/O P-5'-P-CCNC: 18 U/L

## 2017-11-08 PROCEDURE — 36415 COLL VENOUS BLD VENIPUNCTURE: CPT | Mod: PO

## 2017-11-08 PROCEDURE — 84460 ALANINE AMINO (ALT) (SGPT): CPT

## 2017-11-14 ENCOUNTER — HOSPITAL ENCOUNTER (OUTPATIENT)
Dept: UROLOGY | Facility: HOSPITAL | Age: 57
Discharge: HOME | End: 2017-11-14
Attending: UROLOGY
Payer: COMMERCIAL

## 2017-11-14 VITALS
TEMPERATURE: 98 F | SYSTOLIC BLOOD PRESSURE: 143 MMHG | RESPIRATION RATE: 18 BRPM | BODY MASS INDEX: 30.46 KG/M2 | HEART RATE: 92 BPM | HEIGHT: 77 IN | WEIGHT: 257.94 LBS | DIASTOLIC BLOOD PRESSURE: 89 MMHG

## 2017-11-14 DIAGNOSIS — R97.20 ELEVATED PSA: ICD-10-CM

## 2017-11-14 PROCEDURE — 88305 TISSUE EXAM BY PATHOLOGIST: CPT | Mod: 26,,, | Performed by: PATHOLOGY

## 2017-11-14 PROCEDURE — 27200939 HC NEEDLE, BIOPSY (ANY)

## 2017-11-14 PROCEDURE — 88305 TISSUE EXAM BY PATHOLOGIST: CPT | Performed by: PATHOLOGY

## 2017-11-14 PROCEDURE — 76942 ECHO GUIDE FOR BIOPSY: CPT

## 2017-11-14 PROCEDURE — 55700 HC BIOPSY OF PROSTATE - NEEDLE OR PUNCH: CPT

## 2017-11-14 PROCEDURE — 76872 US TRANSRECTAL: CPT

## 2017-11-14 PROCEDURE — 76872 US TRANSRECTAL: CPT | Mod: 26,,, | Performed by: UROLOGY

## 2017-11-14 PROCEDURE — 55700 PR BIOPSY OF PROSTATE,NEEDLE/PUNCH: CPT | Mod: ,,, | Performed by: UROLOGY

## 2017-11-14 PROCEDURE — 76942 ECHO GUIDE FOR BIOPSY: CPT | Mod: 26,59,, | Performed by: UROLOGY

## 2017-11-14 RX ORDER — LIDOCAINE HYDROCHLORIDE 20 MG/ML
JELLY TOPICAL ONCE
Status: COMPLETED | OUTPATIENT
Start: 2017-11-14 | End: 2017-11-14

## 2017-11-14 RX ORDER — LIDOCAINE HYDROCHLORIDE 20 MG/ML
JELLY TOPICAL ONCE
Status: DISCONTINUED | OUTPATIENT
Start: 2017-11-14 | End: 2021-02-03

## 2017-11-14 RX ORDER — LIDOCAINE HYDROCHLORIDE 10 MG/ML
10 INJECTION INFILTRATION; PERINEURAL ONCE
Status: COMPLETED | OUTPATIENT
Start: 2017-11-14 | End: 2017-11-14

## 2017-11-14 RX ORDER — LIDOCAINE HYDROCHLORIDE 10 MG/ML
10 INJECTION INFILTRATION; PERINEURAL ONCE
Status: SHIPPED | OUTPATIENT
Start: 2017-11-14

## 2017-11-14 RX ADMIN — LIDOCAINE HYDROCHLORIDE 20 ML: 20 JELLY TOPICAL at 10:11

## 2017-11-14 RX ADMIN — LIDOCAINE HYDROCHLORIDE 20 ML: 10 INJECTION INFILTRATION; PERINEURAL at 10:11

## 2017-11-14 NOTE — PROCEDURES
Procedure Date:  11/14/2017    Procedure:                                                                        Transrectal Ultrasound of the Prostate                                       Transrectal Ultrasound Guided Prostate Biopsy                                - With Pudendal Nerve Block                                                                                      Pre-OP Diagnosis:                                                                 Elevated PSA                                              Post-OP Diagnosis:                                                                Awaiting final pathology                                                Anesthesia:                                                                       Anesthesia Administered:                                                     Intrarectal instillation of 10 mL 2% lidocaine (Xylocaine) jelly.       Findings:                                                                         --- Transrectal Ultrasound of the Prostate ---                               Prostate measurements.                                                                                               PSA:   Lab Results   Component Value Date    PSA 6.3 (H) 11/08/2013    PSADIAG 8.3 (H) 10/19/2017            - Volume:56 gm.           PSA Density: 0.15                                                         yes calcification in the prostate                          Description of Procedure:                                                         Informed Consent:                                                            - Risks, benefits and alternatives of procedure discussed with               patient and informed consent obtained.                                       Patient Position:                                                            - Left lateral.                                                              --- Transrectal Ultrasound of the  Prostate ---                               Instruments:                                                                 - Delmis.                                                           --- Transrectal U/S Biopsy ---                                               Instruments:                                                                 - Spring-loaded gun used for biopsy.                                         Procedure Details:                                                           Biopsy Core Details:                                                         - Twelve core(s) in total.                                                   - Cores Taken From: Right apex x 2, right mid prostate x 2, right            base x 2, and right Tz x 2.  left apex x 2, left mid prostate x 2 and left base x 2 and left Tz x 2.            Estimated Blood Loss:                                                        - Minimal.                                                                   Pathology Specimen:                                                          - The specimen sent.                                                    Complications:                                                                    No immediate complications.                                             Post-OP Plan:                                                                                            Patient was discharged home in a stable condition.         Medications: finish off cipro given.         Will call him with the bx result.          If fever or unable to void, RTC or emergency room immediately.                                           RTC 6 months with PSA.

## 2017-11-14 NOTE — PATIENT INSTRUCTIONS

## 2017-11-20 ENCOUNTER — TELEPHONE (OUTPATIENT)
Dept: UROLOGY | Facility: CLINIC | Age: 57
End: 2017-11-20

## 2017-11-21 ENCOUNTER — OFFICE VISIT (OUTPATIENT)
Dept: UROLOGY | Facility: CLINIC | Age: 57
End: 2017-11-21
Payer: COMMERCIAL

## 2017-11-21 VITALS — WEIGHT: 262.13 LBS | BODY MASS INDEX: 30.95 KG/M2 | RESPIRATION RATE: 18 BRPM | HEIGHT: 77 IN

## 2017-11-21 DIAGNOSIS — R97.20 ELEVATED PSA: Primary | ICD-10-CM

## 2017-11-21 DIAGNOSIS — C61 PROSTATE CANCER: ICD-10-CM

## 2017-11-21 PROCEDURE — 99999 PR PBB SHADOW E&M-EST. PATIENT-LVL III: CPT | Mod: PBBFAC,,, | Performed by: UROLOGY

## 2017-11-21 PROCEDURE — 99215 OFFICE O/P EST HI 40 MIN: CPT | Mod: S$GLB,,, | Performed by: UROLOGY

## 2017-11-21 NOTE — PROGRESS NOTES
CC:  newly diagnosed prostate cancer.    Dmitri Watt is a 57 y.o. man who is here for the discussion of newly diagnosed prostate cancer.    TRUS bx of prostate on 11/14/17 showed: Volume 56 grams  SPECIMEN  1) Prostate, left apex.  2) Prostate, left middle.  3) Prostate, left base.  4) Prostate, right apex.  5) Prostate, right middle.  6) Prostate, right base.  7) Prostate, left transition zone.  8) Prostate, right transition zone.  FINAL PATHOLOGIC DIAGNOSIS  PROSTATE BIOPSIES 2, 3, 4, 5, 6, 7, AND 8:  NO CARCINOMA IDENTIFIED  1. BIOPSY OF LEFT APEX OF PROSTATE:  ADENOCARCINOMA FELTON TOTAL SCORE 6 (3+3) INVOLVING 15% OF THIS SPECIMEN (1 OF 3 CORES)    s/p TRUS bx revealing no prostate cancer but enlarged prostate of 63 grams in size back in 4/2014.  Has been on Flomax and Finasteride since then.  Noticed improvement on his urinary symptoms more so lately since the medical trial.  However, he has experienced decreased sexual drive, erectile function and ejaculation disorder.  His wife underwent removal of ovary related to breast cancer and the couple experienced less frequent sexual encounter.  He used to use a small fragment of viagra to enhance his erectile function.  But now since using flomax and finasteride, he has to use much higher dose of viagra.    When he had elevated PSA 8.7 in 12/2016, we decided to further evaluate him with MRI and a repeat PSA.  His PSA came down to 6.2 in 2/1027 after he was abstinent from sex prior to his PSA blood draw.  MRI 2/15/17  No evidence of discrete prostate lesion.    Thus so far we decided to follow him with a serial PSA.  His friend was diagnosed with prostate cancer and was treated locally here in [place].  He is concerned about his elevated PSA.    Past Medical History:   Diagnosis Date    Elevated PSA      Past Surgical History:   Procedure Laterality Date    none       Social History   Substance Use Topics    Smoking status: Never Smoker    Smokeless  tobacco: Never Used    Alcohol use Yes      Comment: socially     Family History   Problem Relation Age of Onset    Cancer Maternal Aunt      colon     Allergy:  Review of patient's allergies indicates:   Allergen Reactions    No known drug allergies      Outpatient Encounter Prescriptions as of 11/21/2017   Medication Sig Dispense Refill    finasteride (PROSCAR) 5 mg tablet Take 1 tablet (5 mg total) by mouth once daily. 90 tablet 3    glucosamine-chondroitin 500-400 mg tablet Take 1 tablet by mouth 3 (three) times daily.      ibuprofen (ADVIL,MOTRIN) 100 MG tablet Take 100 mg by mouth every 6 (six) hours as needed.      sildenafil (VIAGRA) 100 MG tablet Take 1 tablet (100 mg total) by mouth once daily. 5 tablet 11    tamsulosin (FLOMAX) 0.4 mg Cp24 Take 1 capsule (0.4 mg total) by mouth every evening. 90 capsule 3     Facility-Administered Encounter Medications as of 11/21/2017   Medication Dose Route Frequency Provider Last Rate Last Dose    gentamicin injection 160 mg  160 mg Intramuscular Once Raheel H. MD Don        lidocaine HCL 10 mg/ml (1%) injection 10 mL  10 mL Infiltration Once Raheel H. MD Don        lidocaine HCL 2% jelly   Topical (Top) Once Raheel H. MD Don         Review of Systems   General ROS: GENERAL:  No weight gain or loss  SKIN:  No rashes or lacerations  HEAD:  No headaches  EYES:  No exophthalmos, jaundice or blindness  EARS:  No dizziness, tinnitus or hearing loss  NOSE:  No changes in smell  MOUTH & THROAT:  No dyskinetic movements or obvious goiter  CHEST:  No shortness of breath, hyperventilation or cough  CARDIOVASCULAR:  No tachycardia or chest pain  ABDOMEN:  No nausea, vomiting, pain, constipation or diarrhea  URINARY:  No frequency, dysuria or sexual dysfunction  ENDOCRINE:  No polydipsia, polyuria  MUSCULOSKELETAL:  No pain or stiffness of the joints  NEUROLOGIC:  No weakness, sensory changes, seizures, confusion, memory loss, tremor or other abnormal  movements  Physical Exam     Vitals:    11/21/17 0805   Resp: 18     General Appearance:  Alert, cooperative, no distress, appears stated age   Head:  Normocephalic, without obvious abnormality, atraumatic   Eyes:  PERRL, conjunctiva/corneas clear, EOM's intact, fundi benign, both eyes   Ears:  Normal TM's and external ear canals, both ears   Nose: Nares normal, septum midline, mucosa normal, no drainage or sinus tenderness   Throat: Lips, mucosa, and tongue normal; teeth and gums normal   Neck: Supple, symmetrical, trachea midline, no adenopathy, thyroid: not enlarged, symmetric, no tenderness/mass/nodules, no carotid bruit or JVD   Back:   Symmetric, no curvature, ROM normal, no CVA tenderness   Lungs:   Clear to auscultation bilaterally, respirations unlabored   Chest Wall:  No tenderness or deformity   Heart:  Regular rate and rhythm, S1, S2 normal, no murmur, rub or gallop   Abdomen:   Soft, non-tender, bowel sounds active all four quadrants,  no masses, no organomegaly of liver and spleen  No hernia noted   Genitalia:  Scrotum: no rash or lesion  Normal symmetric epididymis without masses  Normal vas palpated  Normal size, symmetric testicles with no masses   Normal urethral meatus with no discharge  Normal circumcised penis with no lesion   Rectal:  Normal perineum and anus upon inspection.  Normal tone, no masses or tenderness;   Extremities: Extremities normal, atraumatic, no cyanosis or edema   Pulses: 2+ and symmetric   Skin: Skin color, texture, turgor normal, no rashes or lesions   Lymph nodes: Cervical, supraclavicular, and axillary nodes normal   Neurologic: Normal     Prostate 45 grams smooth with no nodule or tenderness.    LABS:  Lab Results   Component Value Date    PSA 6.3 (H) 11/08/2013    PSA 4.81 (H) 05/21/2012    PSA 4.72 (H) 03/09/2012    PSA 5.33 (H) 12/21/2011    PSA 2.9 01/21/2009    PSADIAG 8.3 (H) 10/19/2017    PSADIAG 8.3 (H) 07/20/2017    PSADIAG 6.2 (H) 02/02/2017    PSADIAG 8.7 (H)  12/05/2016    PSADIAG 6.4 (H) 12/29/2015    PSADIAG 4.5 (H) 06/01/2015    PSADIAG 4.0 11/04/2014    PSADIAG 5.6 (H) 01/27/2014     Results for orders placed or performed in visit on 12/29/15   Prostate Specific Antigen, Diagnostic   Result Value Ref Range    PSA DIAGNOSTIC 6.4 (H) 0.00-4.00 ng/mL   Results for orders placed or performed in visit on 06/01/15   Prostate Specific Antigen, Diagnostic   Result Value Ref Range    PSA DIAGNOSTIC 4.5 (H) 0.00-4.00 ng/mL   Results for orders placed or performed in visit on 11/04/14   Prostate Specific Antigen, Diagnostic   Result Value Ref Range    PSA DIAGNOSTIC 4.0 0.00-4.00 ng/mL     Lab Results   Component Value Date    CREATININE 1.3 06/26/2017    CREATININE 1.3 11/04/2014    CREATININE 1.5 (H) 11/08/2013     Results for orders placed or performed in visit on 11/04/14   Testosterone   Result Value Ref Range    Testosterone, Total 575 195.0-1138.0 ng/dL     MRI 2/15/17  The prostate measures 4.7 x 4.5 x 4.8 cm with mild heterogeneity within the central gland.  No focal masses are identified.  There is no evidence of restricted diffusion within the prostate.     Assessment and Plan:  Dmitri was seen today for elevated psa.    Diagnoses and all orders for this visit:    Elevated PSA    Prostate cancer      Repeat prostate bx for persistently elevated PSA on 11/14/17 showed low volume, low risk prostate cancer.  Nature and risks of his prostate cancer, options of treatment including active surveillance, surgery, and radiation treatments ( External beam radiation vs. Brachytherapy) were discussed extensively.    Despite my recommendation for active surveillance, he may be interested in definitely treatment such as surgery or radiation therapy.  His prostate volume is 56 grams in size.  If he is interested in brachytherapy, I recommend Trelstar injection as neoadjuvant therapy to decrease the size of his prostate.   Then consider a brachytherapy of prostate in 4 to 6 months  later.  Or he can have RALP.  He will let me know what he desires to do for his prostate cancer.  For now, will plan active surveillance for his prostate cancer.  I spent 40 minutes with the patient of which more than half was spent in direct consultation with the patient in regards to our treatment and plan.    Continue flomax and finasteride.    Follow-up:  Return in about 3 months (around 2/21/2018) for PSA.

## 2017-12-08 ENCOUNTER — LAB VISIT (OUTPATIENT)
Dept: LAB | Facility: HOSPITAL | Age: 57
End: 2017-12-08
Attending: INTERNAL MEDICINE
Payer: COMMERCIAL

## 2017-12-08 DIAGNOSIS — B35.1 ONYCHOMYCOSIS: ICD-10-CM

## 2017-12-08 LAB — ALT SERPL W/O P-5'-P-CCNC: 21 U/L

## 2017-12-08 PROCEDURE — 84460 ALANINE AMINO (ALT) (SGPT): CPT

## 2017-12-08 PROCEDURE — 36415 COLL VENOUS BLD VENIPUNCTURE: CPT | Mod: PO

## 2018-02-20 DIAGNOSIS — N40.1 BENIGN PROSTATIC HYPERPLASIA WITH URINARY OBSTRUCTION: ICD-10-CM

## 2018-02-20 DIAGNOSIS — N13.8 BENIGN PROSTATIC HYPERPLASIA WITH URINARY OBSTRUCTION: ICD-10-CM

## 2018-02-21 RX ORDER — FINASTERIDE 5 MG/1
TABLET, FILM COATED ORAL
Qty: 90 TABLET | Refills: 0 | Status: SHIPPED | OUTPATIENT
Start: 2018-02-21 | End: 2018-05-23 | Stop reason: SDUPTHER

## 2018-02-21 RX ORDER — TAMSULOSIN HYDROCHLORIDE 0.4 MG/1
CAPSULE ORAL
Qty: 90 CAPSULE | Refills: 0 | Status: SHIPPED | OUTPATIENT
Start: 2018-02-21 | End: 2018-05-23 | Stop reason: SDUPTHER

## 2018-03-05 ENCOUNTER — LAB VISIT (OUTPATIENT)
Dept: LAB | Facility: HOSPITAL | Age: 58
End: 2018-03-05
Attending: UROLOGY
Payer: COMMERCIAL

## 2018-03-05 DIAGNOSIS — R97.20 ELEVATED PSA: ICD-10-CM

## 2018-03-05 LAB — COMPLEXED PSA SERPL-MCNC: 10.9 NG/ML

## 2018-03-05 PROCEDURE — 84153 ASSAY OF PSA TOTAL: CPT

## 2018-03-05 PROCEDURE — 36415 COLL VENOUS BLD VENIPUNCTURE: CPT | Mod: PO

## 2018-03-12 ENCOUNTER — OFFICE VISIT (OUTPATIENT)
Dept: UROLOGY | Facility: CLINIC | Age: 58
End: 2018-03-12
Payer: COMMERCIAL

## 2018-03-12 VITALS
BODY MASS INDEX: 31.16 KG/M2 | HEART RATE: 60 BPM | WEIGHT: 263.88 LBS | SYSTOLIC BLOOD PRESSURE: 133 MMHG | HEIGHT: 77 IN | DIASTOLIC BLOOD PRESSURE: 79 MMHG

## 2018-03-12 DIAGNOSIS — D49.59 NEOPLASM OF PROSTATE: ICD-10-CM

## 2018-03-12 DIAGNOSIS — R97.20 ELEVATED PSA: ICD-10-CM

## 2018-03-12 DIAGNOSIS — C61 PROSTATE CANCER: Primary | ICD-10-CM

## 2018-03-12 PROCEDURE — 99999 PR PBB SHADOW E&M-EST. PATIENT-LVL IV: CPT | Mod: PBBFAC,,, | Performed by: UROLOGY

## 2018-03-12 PROCEDURE — 99215 OFFICE O/P EST HI 40 MIN: CPT | Mod: S$GLB,,, | Performed by: UROLOGY

## 2018-03-12 NOTE — PROGRESS NOTES
CC:  To discuss his prostate cancer    Dmitri Watt is a 57 y.o. man who is here for the discussion of newly diagnosed prostate cancer.  He already had prostate cancer talk with me back in 11/21/17.  I thought that he would have decided on definite treatment of his prostate cancer due to rising PSA.  But apparently he decided to continue to do a watchful waiting so far.  He is here with PSA.  Has been on flomax and finasteride for [place].    TRUS bx of prostate on 11/14/17 showed: Volume 56 grams  SPECIMEN  1) Prostate, left apex.  2) Prostate, left middle.  3) Prostate, left base.  4) Prostate, right apex.  5) Prostate, right middle.  6) Prostate, right base.  7) Prostate, left transition zone.  8) Prostate, right transition zone.  FINAL PATHOLOGIC DIAGNOSIS  PROSTATE BIOPSIES 2, 3, 4, 5, 6, 7, AND 8:  NO CARCINOMA IDENTIFIED  1. BIOPSY OF LEFT APEX OF PROSTATE:  ADENOCARCINOMA FELTON TOTAL SCORE 6 (3+3) INVOLVING 15% OF THIS SPECIMEN (1 OF 3 CORES)    s/p TRUS bx revealing no prostate cancer but enlarged prostate of 63 grams in size back in 4/2014.  Has been on Flomax and Finasteride since then.  Noticed improvement on his urinary symptoms more so lately since the medical trial.  However, he has experienced decreased sexual drive, erectile function and ejaculation disorder.  His wife underwent removal of ovary related to breast cancer and the couple experienced less frequent sexual encounter.  He used to use a small fragment of viagra to enhance his erectile function.  But now since using flomax and finasteride, he has to use much higher dose of viagra.    When he had elevated PSA 8.7 in 12/2016, we decided to further evaluate him with MRI and a repeat PSA.  His PSA came down to 6.2 in 2/1027 after he was abstinent from sex prior to his PSA blood draw.  MRI 2/15/17  No evidence of discrete prostate lesion.    His friend was diagnosed with prostate cancer and was treated locally here in [place].  He is  concerned about his elevated PSA.    Past Medical History:   Diagnosis Date    Elevated PSA      Past Surgical History:   Procedure Laterality Date    none       Social History   Substance Use Topics    Smoking status: Never Smoker    Smokeless tobacco: Never Used    Alcohol use Yes      Comment: socially     Family History   Problem Relation Age of Onset    Cancer Maternal Aunt      colon     Allergy:  Review of patient's allergies indicates:   Allergen Reactions    No known drug allergies      Outpatient Encounter Prescriptions as of 3/12/2018   Medication Sig Dispense Refill    finasteride (PROSCAR) 5 mg tablet TAKE 1 TABLET(5 MG) BY MOUTH EVERY DAY 90 tablet 0    glucosamine-chondroitin 500-400 mg tablet Take 1 tablet by mouth 3 (three) times daily.      ibuprofen (ADVIL,MOTRIN) 100 MG tablet Take 100 mg by mouth every 6 (six) hours as needed.      sildenafil (VIAGRA) 100 MG tablet Take 1 tablet (100 mg total) by mouth once daily. 5 tablet 11    tamsulosin (FLOMAX) 0.4 mg Cp24 TAKE 1 CAPSULE(0.4 MG) BY MOUTH EVERY EVENING 90 capsule 0     Facility-Administered Encounter Medications as of 3/12/2018   Medication Dose Route Frequency Provider Last Rate Last Dose    gentamicin injection 160 mg  160 mg Intramuscular Once Raheel Ochoa MD        lidocaine HCL 10 mg/ml (1%) injection 10 mL  10 mL Infiltration Once Raheel Ochoa MD        lidocaine HCL 2% jelly   Topical (Top) Once Raheel ELLER. MD Don         Review of Systems   General ROS: GENERAL:  No weight gain or loss  SKIN:  No rashes or lacerations  HEAD:  No headaches  EYES:  No exophthalmos, jaundice or blindness  EARS:  No dizziness, tinnitus or hearing loss  NOSE:  No changes in smell  MOUTH & THROAT:  No dyskinetic movements or obvious goiter  CHEST:  No shortness of breath, hyperventilation or cough  CARDIOVASCULAR:  No tachycardia or chest pain  ABDOMEN:  No nausea, vomiting, pain, constipation or diarrhea  URINARY:  No frequency, dysuria  or sexual dysfunction  ENDOCRINE:  No polydipsia, polyuria  MUSCULOSKELETAL:  No pain or stiffness of the joints  NEUROLOGIC:  No weakness, sensory changes, seizures, confusion, memory loss, tremor or other abnormal movements  Physical Exam     Vitals:    03/12/18 1530   BP: 133/79   Pulse: 60     General Appearance:  Alert, cooperative, no distress, appears stated age   Head:  Normocephalic, without obvious abnormality, atraumatic   Eyes:  PERRL, conjunctiva/corneas clear, EOM's intact, fundi benign, both eyes   Ears:  Normal TM's and external ear canals, both ears   Nose: Nares normal, septum midline, mucosa normal, no drainage or sinus tenderness   Throat: Lips, mucosa, and tongue normal; teeth and gums normal   Neck: Supple, symmetrical, trachea midline, no adenopathy, thyroid: not enlarged, symmetric, no tenderness/mass/nodules, no carotid bruit or JVD   Back:   Symmetric, no curvature, ROM normal, no CVA tenderness   Lungs:   Clear to auscultation bilaterally, respirations unlabored   Chest Wall:  No tenderness or deformity   Heart:  Regular rate and rhythm, S1, S2 normal, no murmur, rub or gallop   Abdomen:   Soft, non-tender, bowel sounds active all four quadrants,  no masses, no organomegaly of liver and spleen  No hernia noted   Genitalia:  Scrotum: no rash or lesion  Normal symmetric epididymis without masses  Normal vas palpated  Normal size, symmetric testicles with no masses   Normal urethral meatus with no discharge  Normal circumcised penis with no lesion   Rectal:  Normal perineum and anus upon inspection.  Normal tone, no masses or tenderness;   Extremities: Extremities normal, atraumatic, no cyanosis or edema   Pulses: 2+ and symmetric   Skin: Skin color, texture, turgor normal, no rashes or lesions   Lymph nodes: Cervical, supraclavicular, and axillary nodes normal   Neurologic: Normal     Prostate 45 grams smooth with no nodule or tenderness.    LABS:  Lab Results   Component Value Date    PSA  6.3 (H) 11/08/2013    PSA 4.81 (H) 05/21/2012    PSA 4.72 (H) 03/09/2012    PSADIAG 10.9 (H) 03/05/2018    PSADIAG 8.3 (H) 10/19/2017    PSADIAG 8.3 (H) 07/20/2017       Lab Results   Component Value Date    CREATININE 1.3 06/26/2017    CREATININE 1.3 11/04/2014    CREATININE 1.5 (H) 11/08/2013     Results for orders placed or performed in visit on 11/04/14   Testosterone   Result Value Ref Range    Testosterone, Total 575 195.0-1138.0 ng/dL     MRI 2/15/17  The prostate measures 4.7 x 4.5 x 4.8 cm with mild heterogeneity within the central gland.  No focal masses are identified.  There is no evidence of restricted diffusion within the prostate.     Assessment and Plan:  Dmitri was seen today for follow-up.    Diagnoses and all orders for this visit:    Prostate cancer  -     MRI Prostate W W/O Contrast; Future  -     NM Bone Scan Whole Body; Future    Elevated PSA  -     MRI Prostate W W/O Contrast; Future  -     NM Bone Scan Whole Body; Future    Neoplasm of prostate  -     MRI Prostate W W/O Contrast; Future      Repeat prostate bx for persistently elevated PSA on 11/14/17 showed low volume, low risk prostate cancer.  Nature and risks of his prostate cancer, options of treatment including active surveillance, surgery, and radiation treatments ( External beam radiation vs. Brachytherapy) were discussed extensively again.  He would like to repeat the MRI of prostate.  I will add a bone scan as well to r/o mets.    But I do recommend a definite treatment either surgery or radiation therapy.  If he decided to undergo brachytherapy, he has to receive ADT first to shrink the size of his prostate prior to undergoing brachytherapy.    Potential side effects on each therapy again explained in detail.  Will refer him to Dr. Talbert and Dr. James for second opinion after MRI and Bone scan are done.    I spent 40 minutes with the patient of which more than half was spent in direct consultation with the patient in regards  to our treatment and plan.    Continue flomax and finasteride for now for his LUTS.    Follow-up:  Follow-up for bone scan, MRI of prostate.

## 2018-03-13 ENCOUNTER — TELEPHONE (OUTPATIENT)
Dept: RADIATION ONCOLOGY | Facility: CLINIC | Age: 58
End: 2018-03-13

## 2018-03-23 ENCOUNTER — HOSPITAL ENCOUNTER (OUTPATIENT)
Dept: RADIOLOGY | Facility: HOSPITAL | Age: 58
Discharge: HOME | End: 2018-03-23
Attending: UROLOGY
Payer: COMMERCIAL

## 2018-03-23 DIAGNOSIS — C61 PROSTATE CANCER: ICD-10-CM

## 2018-03-23 DIAGNOSIS — R97.20 ELEVATED PSA: ICD-10-CM

## 2018-03-23 DIAGNOSIS — D49.59 NEOPLASM OF PROSTATE: ICD-10-CM

## 2018-03-23 PROCEDURE — 25500020 PHARM REV CODE 255: Performed by: UROLOGY

## 2018-03-23 PROCEDURE — 72197 MRI PELVIS W/O & W/DYE: CPT | Mod: TC

## 2018-03-23 PROCEDURE — A9585 GADOBUTROL INJECTION: HCPCS | Performed by: UROLOGY

## 2018-03-23 PROCEDURE — 72197 MRI PELVIS W/O & W/DYE: CPT | Mod: 26,,, | Performed by: RADIOLOGY

## 2018-03-23 RX ORDER — GADOBUTROL 604.72 MG/ML
10 INJECTION INTRAVENOUS
Status: COMPLETED | OUTPATIENT
Start: 2018-03-23 | End: 2018-03-23

## 2018-03-23 RX ADMIN — GADOBUTROL 10 ML: 604.72 INJECTION INTRAVENOUS at 11:03

## 2018-03-27 ENCOUNTER — HOSPITAL ENCOUNTER (OUTPATIENT)
Dept: RADIOLOGY | Facility: HOSPITAL | Age: 58
Discharge: HOME | End: 2018-03-27
Attending: UROLOGY
Payer: COMMERCIAL

## 2018-03-27 DIAGNOSIS — R97.20 ELEVATED PSA: ICD-10-CM

## 2018-03-27 DIAGNOSIS — C61 PROSTATE CANCER: ICD-10-CM

## 2018-03-27 PROCEDURE — A9503 TC99M MEDRONATE: HCPCS

## 2018-03-27 PROCEDURE — 78306 BONE IMAGING WHOLE BODY: CPT | Mod: 26,,, | Performed by: RADIOLOGY

## 2018-04-03 ENCOUNTER — OFFICE VISIT (OUTPATIENT)
Dept: UROLOGY | Facility: CLINIC | Age: 58
End: 2018-04-03
Payer: COMMERCIAL

## 2018-04-03 VITALS
HEIGHT: 77 IN | SYSTOLIC BLOOD PRESSURE: 137 MMHG | RESPIRATION RATE: 15 BRPM | HEART RATE: 61 BPM | DIASTOLIC BLOOD PRESSURE: 87 MMHG | WEIGHT: 262 LBS | BODY MASS INDEX: 30.94 KG/M2

## 2018-04-03 DIAGNOSIS — C61 PROSTATE CANCER: Primary | ICD-10-CM

## 2018-04-03 PROCEDURE — 99999 PR PBB SHADOW E&M-EST. PATIENT-LVL III: CPT | Mod: PBBFAC,,, | Performed by: UROLOGY

## 2018-04-03 PROCEDURE — 99215 OFFICE O/P EST HI 40 MIN: CPT | Mod: S$GLB,,, | Performed by: UROLOGY

## 2018-04-03 NOTE — LETTER
April 3, 2018        ZAY Leavitt MD  [address]  [place] [address]             [facility] [place]  [address]  [place] [address]  Phone: [phone]   Patient: Dmitri Watt   MR Number: [id number]   YOB: 1960   Date of Visit: 4/3/2018       Dear Dr. Leavitt:    Thank you for referring Dmitri Watt to me for evaluation. Attached you will find relevant portions of my assessment and plan of care.    If you have questions, please do not hesitate to call me. I look forward to following Dmitri Watt along with you.    Sincerely,      Carlos Eduardo Talbert MD            CC  Raheel Ochoa MD    [facility]

## 2018-04-03 NOTE — PROGRESS NOTES
Clinic Note  4/3/2018      Subjective:         Chief Complaint:   HPI  Dmitri Watt is a 58 y.o. male with a history of prostate cancer. Has been on AS. Consult from Dr. Ochoa.  On finasteride and flomax for BPH. 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    T1C  PSAi- 10.9 ( corrected 21.8)  Volume- 56 ccs  MRI- 3/23/2018- PIRADS 4, 1.3 cc, left middle, negative EPE, SV, bladder, nodes.  Biopsy [place] (11/14/17) 6 left apex 1/3, 15%  Bone scan- no mets  LUIS FERNANDO score- 4  Intermediate risk disease    Lab Results   Component Value Date    PSA 6.3 (H) 11/08/2013    PSA 4.81 (H) 05/21/2012    PSA 4.72 (H) 03/09/2012    PSA 5.33 (H) 12/21/2011    PSA 2.9 01/21/2009    PSADIAG 10.9 (H) 03/05/2018    PSADIAG 8.3 (H) 10/19/2017    PSADIAG 8.3 (H) 07/20/2017    PSADIAG 6.2 (H) 02/02/2017    PSADIAG 8.7 (H) 12/05/2016    PSADIAG 6.4 (H) 12/29/2015    PSADIAG 4.5 (H) 06/01/2015    PSADIAG 4.0 11/04/2014    PSADIAG 5.6 (H) 01/27/2014      Past Medical History:   Diagnosis Date    Elevated PSA      Family History   Problem Relation Age of Onset    Cancer Maternal Aunt      colon     Social History     Social History    Marital status:      Spouse name: N/A    Number of children: N/A    Years of education: N/A     Occupational History    Not on file.     Social History Main Topics    Smoking status: Never Smoker    Smokeless tobacco: Never Used    Alcohol use Yes      Comment: socially    Drug use: No    Sexual activity: Not Currently     Other Topics Concern    Not on file     Social History Narrative    No narrative on file     Past Surgical History:   Procedure Laterality Date    none       Patient Active Problem List   Diagnosis    Screening    Benign prostatic hyperplasia with urinary obstruction    ED (erectile dysfunction) of organic origin    Slow transit constipation    Dyslipidemia    Onychomycosis    Prostate cancer     Review of Systems      Objective:      There were no  "vitals taken for this visit.  Estimated body mass index is 31.29 kg/m² as calculated from the following:    Height as of 3/12/18: 6' 5" (1.956 m).    Weight as of 3/12/18: 119.7 kg (263 lb 14.3 oz).  Physical Exam      Assessment and Plan:           Problem List Items Addressed This Visit     Prostate cancer - Primary          Follow up:   Today's visit was spent almost entirely on counseling. We reviewed his diagnosis, stage, grade, risk group, and prognosis. We discussed [place] and LUIS FERNANDO risk stratification  We discussed the concept of low risk, moderate risk, and high risk disease. We also reviewed the NCCN treatment nomogram.We discussed the different treatment options including active surveillance (as well as the surveillance protocol), prostate brachytherapy, IMRT, IGRT, cryotherapy, HIFU and both open and robotic prostatectomy.We also discussed the advantages, disadvantages, risks and benefits, as well as complications of each option. Regarding radiation therapy we discussed treatment planning, the different techniques, short and long term complications. These included radiation cystitis, radiation proctitis, and impotence. We discussed success, failure, and salvage therapeutic options.   We discussed surgical therapy in depth including preoperative preparation, surgical technique (including bladder neck and nerve-sparing techniques), postoperative recuperation and recovery, and short and long term complications including UTI, bleeding, blood clots,catheter dislodgement, etc. We discussed the risks of reoperation, incontinence, impotence, and recurrence. We discussed preop and postop Kegels, post op penile rehab, and treatment options for incontinence and impotence. We discussed rates of cancer free survival and recurrence, as well as salvage therapeutic options. We discussed the possible  indications for adjuvant radiation therapy.   I answered questions and addressed concerns. Also discussed the Prolaris " test to get genetic information about this tumors potential future behavior.   Patient leaning towards radiation therapy. Prostate volume would lean towards XRT as the best option.  My nurse will instruct the patient on Kegel exercises today and give them the Kegel exercise instruction sheet.   I spent over 45 minutes with the patient. Over 50% of the visit was spent in counseling.   Letter to Neha Leavitt and Don.    Carlos Eduardo Talbert

## 2018-04-10 ENCOUNTER — INITIAL CONSULT (OUTPATIENT)
Dept: RADIATION ONCOLOGY | Facility: CLINIC | Age: 58
End: 2018-04-10
Payer: COMMERCIAL

## 2018-04-10 VITALS
DIASTOLIC BLOOD PRESSURE: 79 MMHG | SYSTOLIC BLOOD PRESSURE: 134 MMHG | HEART RATE: 55 BPM | WEIGHT: 263.38 LBS | BODY MASS INDEX: 31.1 KG/M2 | HEIGHT: 77 IN

## 2018-04-10 DIAGNOSIS — N13.8 BENIGN PROSTATIC HYPERPLASIA WITH URINARY OBSTRUCTION: ICD-10-CM

## 2018-04-10 DIAGNOSIS — C61 PROSTATE CANCER: Primary | ICD-10-CM

## 2018-04-10 DIAGNOSIS — N40.1 BENIGN PROSTATIC HYPERPLASIA WITH URINARY OBSTRUCTION: ICD-10-CM

## 2018-04-10 PROCEDURE — 99999 PR PBB SHADOW E&M-EST. PATIENT-LVL III: CPT | Mod: PBBFAC,,, | Performed by: RADIOLOGY

## 2018-04-10 PROCEDURE — 99204 OFFICE O/P NEW MOD 45 MIN: CPT | Mod: S$GLB,,, | Performed by: RADIOLOGY

## 2018-04-10 NOTE — LETTER
April 10, 2018      Raheel Ochoa MD  [address]  [place] [address]           [facility]  [address]  [facility] [address]  Phone: [phone]          Patient: Dmitri Watt   MR Number: [id number]   YOB: 1960   Date of Visit: 4/10/2018       Dear Dr. Raheel Ochoa:    Thank you for referring Dmitri Watt to me for evaluation. Attached you will find relevant portions of my assessment and plan of care.    If you have questions, please do not hesitate to call me. I look forward to following Dmitri Watt along with you.    Sincerely,    Jesus James Jr., MD    Enclosure  CC:  No Recipients    If you would like to receive this communication electronically, please contact [email or web address] or [phone] to request more information on [facility] Link access.    For providers and/or their staff who would like to refer a patient to [facility], please contact us through our one-stop-shop provider referral line, [facility], [place], at [phone].    If you feel you have received this communication in error or would no longer like to receive these types of communications, please e-mail [email or web address]

## 2018-04-10 NOTE — PROGRESS NOTES
HISTORY OF PRESENT ILLNESS:   This patient presents for discussion of management options for a recently diagnosed prostate cancer.     Mr. Watt has a long history of BPH.  He has been on finasteride and tamsulosin for a number of years. In 2014 the patient underwent TRUS and biopsy of his prostate which was negative.  Since that time his PSA has fluctuated in the 6 - 8.5 range.  MRI of the pelvis in February of 2017 was negative.  PSA at that time was 6.2 ng/ml.  Repeat PSA in October of 2017 increased to 8.3 ng/ml. The patient underwent repeat TRUS and biopsy of his prostate on 11/14/17.  The prostate measured 56 cc.  Biopsies from the Lt. apex revealed Jordana 6 (3+3) adenocarcinoma involving 15% of one core.  The remaining 15 cores were negative for evidence of malignancy.  Bone scan on 3/27/18 was negative for evidence of metastatic disease.  Repeat MRI on 3/28/18 revealed a 4.9 x 4.5 x 4.9 cm  prostate corresponding to a computed volume of 54.5cc. There was a 1.3cm lesion in the Lt. posterior lateral prostate, PI-RADS 4.  There was no evidence of extracapsular extension.  The seminal vesicles and neurovascular bundles were normal.  There was no evidence of pelvic adenopathy. The patient presents today to discuss his treatment options.  Today, the patient states he feels well.  Notes nocturia at 2 times per night.  Denies urinary hesitancy or dysuria.  The patient does have a history of ED.       REVIEW OF SYSTEMS:   Review of Systems   Constitutional: Negative for chills, diaphoresis, fever and weight loss.   Respiratory: Negative for cough and shortness of breath.    Cardiovascular: Negative for chest pain and palpitations.   Gastrointestinal: Negative for blood in stool, constipation and diarrhea.   Genitourinary: Negative for dysuria, frequency, hematuria and urgency.       PAST MEDICAL HISTORY:  Past Medical History:   Diagnosis Date    Elevated PSA        PAST SURGICAL HISTORY:  Past Surgical  History:   Procedure Laterality Date    none         ALLERGIES:   Review of patient's allergies indicates:   Allergen Reactions    No known drug allergies        MEDICATIONS:  Current Outpatient Prescriptions   Medication Sig    finasteride (PROSCAR) 5 mg tablet TAKE 1 TABLET(5 MG) BY MOUTH EVERY DAY    glucosamine-chondroitin 500-400 mg tablet Take 1 tablet by mouth 3 (three) times daily.    multivitamin capsule Take 1 capsule by mouth once daily.    sildenafil (VIAGRA) 100 MG tablet Take 1 tablet (100 mg total) by mouth once daily.    tamsulosin (FLOMAX) 0.4 mg Cp24 TAKE 1 CAPSULE(0.4 MG) BY MOUTH EVERY EVENING    ibuprofen (ADVIL,MOTRIN) 100 MG tablet Take 100 mg by mouth every 6 (six) hours as needed.     Current Facility-Administered Medications   Medication    gentamicin injection 160 mg    lidocaine HCL 10 mg/ml (1%) injection 10 mL    lidocaine HCL 2% jelly       SOCIAL HISTORY:  Social History     Social History    Marital status:      Spouse name: N/A    Number of children: N/A    Years of education: N/A     Occupational History    Not on file.     Social History Main Topics    Smoking status: Never Smoker    Smokeless tobacco: Never Used    Alcohol use Yes      Comment: socially    Drug use: No    Sexual activity: Not Currently     Other Topics Concern    Not on file     Social History Narrative    No narrative on file       FAMILY HISTORY:  Family History   Problem Relation Age of Onset    Cancer Maternal Aunt      colon    Benign prostatic hyperplasia Father     Cancer Mother          PHYSICAL EXAMINATION:  Vitals:    04/10/18 0954   BP: 134/79   Pulse: (!) 55     Physical Exam   Constitutional: He is oriented to person, place, and time and well-developed, well-nourished, and in no distress.   Neurological: He is alert and oriented to person, place, and time.       ASSESSMENT/PLAN:  Stage IIA (cT1c, cN0, cM0, PSA: 16, Grade Group: 1)    ECO    I had a long discussion  "with the patient and his wife.  We discussed the significance of his PSA and his [place] score.  Explained that despite his increased PSA, the patient appears to have "low risk, low volume" prostate cancer.  His MRI appears to correlate with his biopsy findings.  We discussed the options of active surveillance, surgical resection, external beam therapy using IMRT / IGRT techniques and prostate brachytherapy using Palladium 103 seeds.  Discussed the pros and cons of each approach.  The patient was interested in possible prostate brachytherapy.  Explained that at this point the size of his prostate and his fairly significant LUTS do not make him the ideal candidate for prostate brachytherapy.  Discussed the options of down sizing the prostate with 6 month of hormonal deprivation therapy.  Discussed the side effects of hormonal therapy and prostate brachytherapy.  Explained that in cases of prostate cancer in young patients, I tend to recommend either brachytherapy or surgical resection.  Given the size of his gland and LUTS, I suggested he consider surgical resection given his prostate size, LUTS and age.  The patient is considering active surveillance.  We discussed obtaining a Polaris score.  The patient is interested in pursuing the test.  Will contact Dr. Ochoa.  Plan follow up with us PRN.    Psychosocial Distress screening score of Distress Score: 0 noted and reviewed. No intervention indicated.     I spent approximately 60 minutes reviewing the available records and evaluating the patient, out of which over 50% of the time was spent face to face with the patient in counseling and coordinating this patient's care.      "

## 2018-04-17 ENCOUNTER — PATIENT MESSAGE (OUTPATIENT)
Dept: UROLOGY | Facility: CLINIC | Age: 58
End: 2018-04-17

## 2018-04-20 DIAGNOSIS — C61 PROSTATE CANCER: Primary | ICD-10-CM

## 2018-04-20 NOTE — PROGRESS NOTES
Diagnoses and all orders for this visit:    Prostate cancer      Lab Results   Component Value Date    PSA 6.3 (H) 11/08/2013    PSA 4.81 (H) 05/21/2012    PSA 4.72 (H) 03/09/2012    PSADIAG 10.9 (H) 03/05/2018    PSADIAG 8.3 (H) 10/19/2017    PSADIAG 8.3 (H) 07/20/2017     Prolaris by [employer] ordered today.    Biopsy Test Request Form    Clinical Information:  Prostate cancer: Age at Dx: 57, Pre-biopsy PSA: 8.3 ng/ml, Highest Jordana Score on current Biopsy: 3+3 = 6  Clinical Stage (Based on CYNTHIA): T1c  Biopsy Cores: Total number of cores taken: 17, Total number of positive cores: 1  Date of Biopsy: 11/14/17  Planned Treatment before Prolaris Score: active surveillance  Prostate volume: 56 cc  __ Patient has received pelvic radiation and /or androgen deprivation prior to their biopsy    For Medicare Patients Only:  At the time of biopsy:   __ Hospital Inpatient ( >24 hour stay), Discharge Date:_________  __ Hospital Outpatient  __ Non-Hospital patient

## 2018-04-30 ENCOUNTER — TELEPHONE (OUTPATIENT)
Dept: UROLOGY | Facility: CLINIC | Age: 58
End: 2018-04-30

## 2018-05-09 ENCOUNTER — TELEPHONE (OUTPATIENT)
Dept: UROLOGY | Facility: CLINIC | Age: 58
End: 2018-05-09

## 2018-05-09 NOTE — TELEPHONE ENCOUNTER
I spoke with patient and advised him that I called [facility] for his prolaris study results and was told by Medina that they just received the tissue today and will start the process today. Request paperwork was faxed on 4-26-18.

## 2018-05-15 ENCOUNTER — PATIENT MESSAGE (OUTPATIENT)
Dept: UROLOGY | Facility: CLINIC | Age: 58
End: 2018-05-15

## 2018-05-23 DIAGNOSIS — N13.8 BENIGN PROSTATIC HYPERPLASIA WITH URINARY OBSTRUCTION: ICD-10-CM

## 2018-05-23 DIAGNOSIS — N40.1 BENIGN PROSTATIC HYPERPLASIA WITH URINARY OBSTRUCTION: ICD-10-CM

## 2018-05-29 RX ORDER — TAMSULOSIN HYDROCHLORIDE 0.4 MG/1
CAPSULE ORAL
Qty: 90 CAPSULE | Refills: 0 | Status: SHIPPED | OUTPATIENT
Start: 2018-05-29 | End: 2018-08-23 | Stop reason: SDUPTHER

## 2018-05-29 RX ORDER — FINASTERIDE 5 MG/1
TABLET, FILM COATED ORAL
Qty: 90 TABLET | Refills: 0 | Status: SHIPPED | OUTPATIENT
Start: 2018-05-29 | End: 2018-08-23 | Stop reason: SDUPTHER

## 2018-05-31 ENCOUNTER — OFFICE VISIT (OUTPATIENT)
Dept: UROLOGY | Facility: CLINIC | Age: 58
End: 2018-05-31
Payer: COMMERCIAL

## 2018-05-31 VITALS
DIASTOLIC BLOOD PRESSURE: 80 MMHG | BODY MASS INDEX: 30.85 KG/M2 | HEART RATE: 64 BPM | WEIGHT: 260.13 LBS | SYSTOLIC BLOOD PRESSURE: 135 MMHG

## 2018-05-31 DIAGNOSIS — C61 PROSTATE CANCER: Primary | ICD-10-CM

## 2018-05-31 PROCEDURE — 99999 PR PBB SHADOW E&M-EST. PATIENT-LVL III: CPT | Mod: PBBFAC,,, | Performed by: UROLOGY

## 2018-05-31 PROCEDURE — 3008F BODY MASS INDEX DOCD: CPT | Mod: CPTII,S$GLB,, | Performed by: UROLOGY

## 2018-05-31 PROCEDURE — 99215 OFFICE O/P EST HI 40 MIN: CPT | Mod: S$GLB,,, | Performed by: UROLOGY

## 2018-05-31 NOTE — PROGRESS NOTES
CC:  To discuss his prostate cancer    Dmitri Watt is a 58 y.o. man who is here for the discussion of newly diagnosed prostate cancer.  He already had prostate cancer talk with me back in 11/21/17.  I thought that he would have decided on definite treatment of his prostate cancer due to rising PSA.  But apparently he decided to continue to do a watchful waiting so far.    Prolaris by [employer].  Prolaris score 3.0  Clinical Information:  Prostate cancer: Age at Dx: 57, Pre-biopsy PSA: 8.3 ng/ml, Highest Felton Score on current Biopsy: 3+3 = 6  Clinical Stage (Based on CYNTHIA): T1c  Biopsy Cores: Total number of cores taken: 17, Total number of positive cores: 1  Date of Biopsy: 11/14/17  Planned Treatment before Prolaris Score: active surveillance  Prostate volume: 56 cc    He is here with his wife to discuss Prolaris test results and his prostate cancer management.  Has been on flomax and finasteride for [place].    TRUS bx of prostate on 11/14/17 showed: Volume 56 grams  SPECIMEN  1) Prostate, left apex.  2) Prostate, left middle.  3) Prostate, left base.  4) Prostate, right apex.  5) Prostate, right middle.  6) Prostate, right base.  7) Prostate, left transition zone.  8) Prostate, right transition zone.  FINAL PATHOLOGIC DIAGNOSIS  PROSTATE BIOPSIES 2, 3, 4, 5, 6, 7, AND 8:  NO CARCINOMA IDENTIFIED  1. BIOPSY OF LEFT APEX OF PROSTATE:  ADENOCARCINOMA FELTON TOTAL SCORE 6 (3+3) INVOLVING 15% OF THIS SPECIMEN (1 OF 3 CORES)    s/p TRUS bx revealing no prostate cancer but enlarged prostate of 63 grams in size back in 4/2014.  Has been on Flomax and Finasteride since then.  Noticed improvement on his urinary symptoms more so lately since the medical trial.  However, he has experienced decreased sexual drive, erectile function and ejaculation disorder.  His wife underwent removal of ovary related to breast cancer and the couple experienced less frequent sexual encounter.  He used to use a small fragment of viagra to  enhance his erectile function.  But now since using flomax and finasteride, he has to use much higher dose of viagra.    When he had elevated PSA 8.7 in 12/2016, we decided to further evaluate him with MRI and a repeat PSA.  His PSA came down to 6.2 in 2/1027 after he was abstinent from sex prior to his PSA blood draw.  MRI 2/15/17  No evidence of discrete prostate lesion.    His friend was diagnosed with prostate cancer and was treated locally here in [place].  He is concerned about his elevated PSA.    Past Medical History:   Diagnosis Date    Elevated PSA      Past Surgical History:   Procedure Laterality Date    none       Social History   Substance Use Topics    Smoking status: Never Smoker    Smokeless tobacco: Never Used    Alcohol use Yes      Comment: socially     Family History   Problem Relation Age of Onset    Cancer Maternal Aunt         colon    Benign prostatic hyperplasia Father     Cancer Mother      Allergy:  Review of patient's allergies indicates:   Allergen Reactions    No known drug allergies      Outpatient Encounter Prescriptions as of 5/31/2018   Medication Sig Dispense Refill    finasteride (PROSCAR) 5 mg tablet TAKE 1 TABLET(5 MG) BY MOUTH EVERY DAY 90 tablet 0    glucosamine-chondroitin 500-400 mg tablet Take 1 tablet by mouth 3 (three) times daily.      ibuprofen (ADVIL,MOTRIN) 100 MG tablet Take 100 mg by mouth every 6 (six) hours as needed.      multivitamin capsule Take 1 capsule by mouth once daily.      sildenafil (VIAGRA) 100 MG tablet Take 1 tablet (100 mg total) by mouth once daily. 5 tablet 11    tamsulosin (FLOMAX) 0.4 mg Cp24 TAKE 1 CAPSULE(0.4 MG) BY MOUTH EVERY EVENING 90 capsule 0     Facility-Administered Encounter Medications as of 5/31/2018   Medication Dose Route Frequency Provider Last Rate Last Dose    gentamicin injection 160 mg  160 mg Intramuscular Once Raheel H. MD Don        lidocaine HCL 10 mg/ml (1%) injection 10 mL  10 mL Infiltration  Once Raheel Ochoa MD        lidocaine HCL 2% jelly   Topical (Top) Once Raheel Ochoa MD         Review of Systems   General ROS: GENERAL:  No weight gain or loss  SKIN:  No rashes or lacerations  HEAD:  No headaches  EYES:  No exophthalmos, jaundice or blindness  EARS:  No dizziness, tinnitus or hearing loss  NOSE:  No changes in smell  MOUTH & THROAT:  No dyskinetic movements or obvious goiter  CHEST:  No shortness of breath, hyperventilation or cough  CARDIOVASCULAR:  No tachycardia or chest pain  ABDOMEN:  No nausea, vomiting, pain, constipation or diarrhea  URINARY:  No frequency, dysuria or sexual dysfunction  ENDOCRINE:  No polydipsia, polyuria  MUSCULOSKELETAL:  No pain or stiffness of the joints  NEUROLOGIC:  No weakness, sensory changes, seizures, confusion, memory loss, tremor or other abnormal movements  Physical Exam     Vitals:    05/31/18 1426   BP: 135/80   Pulse: 64     General Appearance:  Alert, cooperative, no distress, appears stated age   Head:  Normocephalic, without obvious abnormality, atraumatic   Eyes:  PERRL, conjunctiva/corneas clear, EOM's intact, fundi benign, both eyes   Ears:  Normal TM's and external ear canals, both ears   Nose: Nares normal, septum midline, mucosa normal, no drainage or sinus tenderness   Throat: Lips, mucosa, and tongue normal; teeth and gums normal   Neck: Supple, symmetrical, trachea midline, no adenopathy, thyroid: not enlarged, symmetric, no tenderness/mass/nodules, no carotid bruit or JVD   Back:   Symmetric, no curvature, ROM normal, no CVA tenderness   Lungs:   Clear to auscultation bilaterally, respirations unlabored   Chest Wall:  No tenderness or deformity   Heart:  Regular rate and rhythm, S1, S2 normal, no murmur, rub or gallop   Abdomen:   Soft, non-tender, bowel sounds active all four quadrants,  no masses, no organomegaly of liver and spleen  No hernia noted   Genitalia:  Scrotum: no rash or lesion  Normal symmetric epididymis without  masses  Normal vas palpated  Normal size, symmetric testicles with no masses   Normal urethral meatus with no discharge  Normal circumcised penis with no lesion   Rectal:  Normal perineum and anus upon inspection.  Normal tone, no masses or tenderness;   Extremities: Extremities normal, atraumatic, no cyanosis or edema   Pulses: 2+ and symmetric   Skin: Skin color, texture, turgor normal, no rashes or lesions   Lymph nodes: Cervical, supraclavicular, and axillary nodes normal   Neurologic: Normal     Prostate 45 grams smooth with no nodule or tenderness.    LABS:  Lab Results   Component Value Date    PSA 6.3 (H) 11/08/2013    PSA 4.81 (H) 05/21/2012    PSA 4.72 (H) 03/09/2012    PSA 5.33 (H) 12/21/2011    PSA 2.9 01/21/2009    PSADIAG 10.9 (H) 03/05/2018    PSADIAG 8.3 (H) 10/19/2017    PSADIAG 8.3 (H) 07/20/2017    PSADIAG 6.2 (H) 02/02/2017    PSADIAG 8.7 (H) 12/05/2016    PSADIAG 6.4 (H) 12/29/2015    PSADIAG 4.5 (H) 06/01/2015    PSADIAG 4.0 11/04/2014    PSADIAG 5.6 (H) 01/27/2014       Lab Results   Component Value Date    CREATININE 1.3 06/26/2017    CREATININE 1.3 11/04/2014    CREATININE 1.5 (H) 11/08/2013     Results for orders placed or performed in visit on 11/04/14   Testosterone   Result Value Ref Range    Testosterone, Total 575 195.0-1138.0 ng/dL     MRI 2/15/17  The prostate measures 4.7 x 4.5 x 4.8 cm with mild heterogeneity within the central gland.  No focal masses are identified.  There is no evidence of restricted diffusion within the prostate.   Bone scan  3/27/2018  There is no scintigraphic evidence of metastatic disease.  Assessment and Plan:  Dmitri was seen today for follow-up.    Diagnoses and all orders for this visit:    Prostate cancer  -     Prostate Specific Antigen, Diagnostic; Future  -     Prostate Specific Antigen, Diagnostic; Future      Repeat prostate bx for persistently elevated PSA on 11/14/17 showed low volume, low risk prostate cancer.  However, his PSA has risen.  He  met and talked to Dr. Talbert, Dr. James and Dr. Martin regarding his prostate cancer management.  He is leaning towards RALP but has not quite decided yet.  I reviewed Prolaris test results in detail.  It confirmed the low risk Prolaris score 3.0    After a long discussion, I do recommend a definite treatment either surgery or radiation therapy.  If he decided to undergo brachytherapy, he has to receive ADT first to shrink the size of his prostate prior to undergoing brachytherapy.    Potential side effects on each therapy again explained in detail.    Will repeat PSA today.  Will have him follow up in 3 months.  He is not definitely decided on the treatment yet, thus will continue active surveillance.    I spent 40 minutes with the patient of which more than half was spent in direct consultation with the patient in regards to our treatment and plan.    Continue flomax and finasteride for now for his LUTS.    Follow-up:  Follow-up in about 3 months (around 8/31/2018) for PSA.

## 2018-05-31 NOTE — PATIENT INSTRUCTIONS
Lab Results   Component Value Date    PSA 6.3 (H) 11/08/2013    PSA 4.81 (H) 05/21/2012    PSA 4.72 (H) 03/09/2012    PSADIAG 10.9 (H) 03/05/2018    PSADIAG 8.3 (H) 10/19/2017    PSADIAG 8.3 (H) 07/20/2017

## 2018-05-31 NOTE — LETTER
May 31, 2018      ZAY Leavitt MD  [address] [facility] [place]  [place] [address]           [facility] - Urology 4th Floor  [address]  [place] [address]  Phone: [phone]          Patient: Dmitri Watt   MR Number: [id number]   YOB: 1960   Date of Visit: 5/31/2018       Dear Dr. ZAY Leavitt:    Thank you for referring Dmitri Watt to me for evaluation. Attached you will find relevant portions of my assessment and plan of care.    If you have questions, please do not hesitate to call me. I look forward to following Dmitri Watt along with you.    Sincerely,    Raheel Ochoa MD    Enclosure  CC:  No Recipients    If you would like to receive this communication electronically, please contact [email or web address] or [phone] to request more information on [employer] access.    For providers and/or their staff who would like to refer a patient to [facility], please contact us through our one-stop-shop provider referral line, [facility], at [phone].    If you feel you have received this communication in error or would no longer like to receive these types of communications, please e-mail [email or web address]

## 2018-08-07 RX ORDER — SILDENAFIL CITRATE 100 MG/1
TABLET, FILM COATED ORAL
Qty: 5 TABLET | Refills: 0 | Status: SHIPPED | OUTPATIENT
Start: 2018-08-07

## 2018-08-23 DIAGNOSIS — N40.1 BENIGN PROSTATIC HYPERPLASIA WITH URINARY OBSTRUCTION: ICD-10-CM

## 2018-08-23 DIAGNOSIS — N13.8 BENIGN PROSTATIC HYPERPLASIA WITH URINARY OBSTRUCTION: ICD-10-CM

## 2018-08-24 RX ORDER — FINASTERIDE 5 MG/1
TABLET, FILM COATED ORAL
Qty: 90 TABLET | Refills: 0 | Status: SHIPPED | OUTPATIENT
Start: 2018-08-24 | End: 2018-11-28 | Stop reason: SDUPTHER

## 2018-08-24 RX ORDER — TAMSULOSIN HYDROCHLORIDE 0.4 MG/1
CAPSULE ORAL
Qty: 90 CAPSULE | Refills: 0 | Status: SHIPPED | OUTPATIENT
Start: 2018-08-24 | End: 2021-02-03

## 2018-09-13 ENCOUNTER — TELEPHONE (OUTPATIENT)
Dept: UROLOGY | Facility: CLINIC | Age: 58
End: 2018-09-13

## 2018-09-13 NOTE — TELEPHONE ENCOUNTER
----- Message from Medina Grant MA sent at 9/13/2018  9:01 AM CDT -----  Contact: self  [phone]  Needs Advice    Reason for call:  I want to get a Cialis script and I am scheduled for a proctectomy and would like the script before the procedure        Communication Preference:  Phone# above    Additional Information:   I would like to speak to him or his nurse

## 2018-09-13 NOTE — TELEPHONE ENCOUNTER
Pt is scheduled to have RALP with dr. Martin at [place] on 10.1.2018-he is asking for cialis to take prior to surgery, I asked him to contact dr. Martin's office as he is the one doing the surgery. He would like to personally thank you for finding the cancer and would like a call whenever you can

## 2018-09-28 ENCOUNTER — PATIENT MESSAGE (OUTPATIENT)
Dept: UROLOGY | Facility: CLINIC | Age: 58
End: 2018-09-28

## 2018-11-29 RX ORDER — FINASTERIDE 5 MG/1
TABLET, FILM COATED ORAL
Qty: 90 TABLET | Refills: 0 | Status: SHIPPED | OUTPATIENT
Start: 2018-11-29 | End: 2019-03-14 | Stop reason: SDUPTHER

## 2019-03-14 RX ORDER — FINASTERIDE 5 MG/1
TABLET, FILM COATED ORAL
Qty: 90 TABLET | Refills: 0 | Status: SHIPPED | OUTPATIENT
Start: 2019-03-14 | End: 2019-06-19 | Stop reason: SDUPTHER

## 2019-06-20 RX ORDER — FINASTERIDE 5 MG/1
TABLET, FILM COATED ORAL
Qty: 90 TABLET | Refills: 0 | Status: SHIPPED | OUTPATIENT
Start: 2019-06-20 | End: 2019-09-30 | Stop reason: SDUPTHER

## 2019-09-30 RX ORDER — FINASTERIDE 5 MG/1
TABLET, FILM COATED ORAL
Qty: 90 TABLET | Refills: 0 | Status: SHIPPED | OUTPATIENT
Start: 2019-09-30 | End: 2021-02-03

## 2019-10-03 ENCOUNTER — TELEPHONE (OUTPATIENT)
Dept: UROLOGY | Facility: CLINIC | Age: 59
End: 2019-10-03

## 2019-10-03 NOTE — TELEPHONE ENCOUNTER
----- Message from Rhea Shields sent at 10/3/2019  1:27 PM CDT -----  Contact: Pt. [phone]  The patient would like to speak to someone regarding scheduling a follow up with Dr. Ochoa.  Please contact the patient to discuss further.

## 2019-10-07 ENCOUNTER — OFFICE VISIT (OUTPATIENT)
Dept: URGENT CARE | Facility: CLINIC | Age: 59
End: 2019-10-07
Payer: COMMERCIAL

## 2019-10-07 VITALS
SYSTOLIC BLOOD PRESSURE: 141 MMHG | TEMPERATURE: 99 F | HEART RATE: 71 BPM | WEIGHT: 260 LBS | OXYGEN SATURATION: 99 % | RESPIRATION RATE: 18 BRPM | DIASTOLIC BLOOD PRESSURE: 91 MMHG | HEIGHT: 77 IN | BODY MASS INDEX: 30.7 KG/M2

## 2019-10-07 DIAGNOSIS — S61.411A LACERATION WITHOUT FOREIGN BODY OF RIGHT HAND, INITIAL ENCOUNTER: Primary | ICD-10-CM

## 2019-10-07 PROCEDURE — 3008F PR BODY MASS INDEX (BMI) DOCUMENTED: ICD-10-PCS | Mod: CPTII,S$GLB,, | Performed by: EMERGENCY MEDICINE

## 2019-10-07 PROCEDURE — 99214 PR OFFICE/OUTPT VISIT, EST, LEVL IV, 30-39 MIN: ICD-10-PCS | Mod: S$GLB,,, | Performed by: EMERGENCY MEDICINE

## 2019-10-07 PROCEDURE — 3008F BODY MASS INDEX DOCD: CPT | Mod: CPTII,S$GLB,, | Performed by: EMERGENCY MEDICINE

## 2019-10-07 PROCEDURE — 99214 OFFICE O/P EST MOD 30 MIN: CPT | Mod: S$GLB,,, | Performed by: EMERGENCY MEDICINE

## 2019-10-07 RX ORDER — MUPIROCIN 20 MG/G
OINTMENT TOPICAL
Qty: 22 G | Refills: 1 | Status: SHIPPED | OUTPATIENT
Start: 2019-10-07

## 2019-10-08 NOTE — PROCEDURES
Laceration Repair  Date/Time: 10/7/2019 7:35 PM  Performed by: Arturo Hilliard III, MD  Authorized by: Artruo Hilliard III, MD   Consent Done: Yes  Anesthesia: local infiltration    Anesthesia:  Local anesthesia used: yes  Local Anesthetic: topical anesthetic and lidocaine 1% without epinephrine  Anesthetic total: 4 mL  Patient sedated: no  Irrigation solution: saline  Irrigation method: syringe  Amount of cleaning: extensive  Debridement: none  Degree of undermining: none  Skin closure: 5-0 Prolene  Subcutaneous closure: 4-0 Vicryl  Number of sutures: 11  Technique: simple  Approximation: close  Approximation difficulty: simple  Dressing: dressing applied and antibiotic ointment  Patient tolerance: Patient tolerated the procedure well with no immediate complications

## 2019-10-08 NOTE — PATIENT INSTRUCTIONS
Go to the Emergency Room if symptoms or condition worsens in any way    Return to Clinic for Suture Removal in 10 days      Extremity Laceration: Sutures, Staples, or Tape  A laceration is a cut through the skin. If it is deep, it may require stitches (sutures) or staples to close so it can heal. Minor cuts may be treated with surgical tape closures.   X-rays may be done if something may have entered the skin through the cut. You may also need a tetanus shot if you are not up to date on this vaccination.  Home care  · Follow the health care providers instructions on how to care for the cut.  · Wash your hands with soap and warm water before and after caring for your wound. This is to help prevent infection.  · Keep the wound clean and dry. If a bandage was applied and it becomes wet or dirty, replace it. Otherwise, leave it in place for the first 24 hours, then change it once a day or as directed.  · If sutures or staples were used, clean the wound daily:  · After removing the bandage, wash the area with soap and water. Use a wet cotton swab to loosen and remove any blood or crust that forms.  · After cleaning, keep the wound clean and dry. Talk with your doctor before applying any antibiotic ointment to the wound. Reapply the bandage.  · You may remove the bandage to shower as usual after the first 24 hours, but do not soak the area in water (no swimming) until the stitches or staples are removed.  · If surgical tape closures were used, keep the area clean and dry. If it becomes wet, blot it dry with a towel.  · The doctor may prescribe an antibiotic cream or ointment to prevent infection. Do not stop taking this medication until you have finished the prescribed course or the doctor tells you to stop. The doctor may also prescribe medications for pain. Follow the doctors instructions for taking these medications.  · Avoid activities that may reopen your wound.  Follow-up care  Follow up with your health care  provider. Most skin wounds heal within ten days. However, an infection may sometimes occur despite proper treatment. Therefore, check the wound daily for the signs of infection listed below. Stitches and staples should be removed within 7-14 days. If surgical tape closures were used, you may remove them after 10 days if they have not fallen off by then.   When to seek medical advice  Call your health care provider right away if any of these occur:  · Wound bleeding not controlled by direct pressure  · Signs of infection, including increasing pain in the wound, increasing wound redness or swelling, or pus or bad odor coming from the wound  · Fever of 100.4°F (38ºC) or higher or as directed by your healthcare provider  · Stitches or staples come apart or fall out or surgical tape falls off before 7 days  · Wound edges re-open  · Wound changes colors  · Numbness around the wound   · Decreased movement around the injured area  Date Last Reviewed: 6/14/2015  © 5531-0618 [employer]. [address] [place] [address], [place] [address]. All rights reserved. This information is not intended as a substitute for professional medical care. Always follow your healthcare professional's instructions.

## 2019-10-08 NOTE — PROGRESS NOTES
"Subjective:       Patient ID: Dmitri Watt is a 59 y.o. male.    Vitals:    10/07/19 1941   BP: (!) 141/91   Pulse: 71   Resp: 18   Temp: 99.2 °F (37.3 °C)   SpO2: 99%   Weight: 117.9 kg (260 lb)   Height: 6' 5" (1.956 m)       Chief Complaint: Laceration (RT HAND BETWEEN MIDDLE AND RING FINGER 30 MINS AGO )    Laceration    The incident occurred less than 1 hour ago. The laceration is located on the right hand. The laceration is 3 cm in size. The laceration mechanism was a blunt object. The pain is at a severity of 5/10. The pain is mild. The pain has been constant since onset. He reports no foreign bodies present. His tetanus status is UTD.     Review of Systems   Constitution: Negative for malaise/fatigue.   HENT: Negative for nosebleeds.    Cardiovascular: Negative for chest pain and syncope.   Respiratory: Negative for shortness of breath.    Musculoskeletal: Negative for back pain, joint pain and neck pain.   Gastrointestinal: Negative for abdominal pain.   Genitourinary: Negative for hematuria.   Neurological: Negative for dizziness, numbness and weakness.       Objective:      Physical Exam   Constitutional: He is oriented to person, place, and time. He appears well-developed and well-nourished.   HENT:   Head: Normocephalic and atraumatic. Head is without abrasion, without contusion and without laceration.   Right Ear: External ear normal.   Left Ear: External ear normal.   Nose: Nose normal.   Mouth/Throat: Oropharynx is clear and moist.   Eyes: Pupils are equal, round, and reactive to light. Conjunctivae, EOM and lids are normal.   Neck: Trachea normal, full passive range of motion without pain and phonation normal. Neck supple.   Cardiovascular: Normal rate, regular rhythm and normal heart sounds.   Pulmonary/Chest: Effort normal and breath sounds normal. No stridor. No respiratory distress.   Musculoskeletal: Normal range of motion.        Right hand: He exhibits laceration.        Hands:  Patient " is a 4 cm laceration to the right hand between the 3rd and 4th digits in intertriginous space, no active bleeding seen patient is neurovascularly intact there is no foreign bodies wound is approximately his 0.5 cm deep there is no tendon involvement   Neurological: He is alert and oriented to person, place, and time.   Skin: Skin is warm, dry and intact. Capillary refill takes less than 2 seconds. No abrasion, no bruising, no burn, no ecchymosis, no laceration, no lesion and no rash noted. No erythema.   Psychiatric: He has a normal mood and affect. His speech is normal and behavior is normal. Judgment and thought content normal. Cognition and memory are normal.   Nursing note and vitals reviewed.      Assessment:       1. Laceration without foreign body of right hand, initial encounter        Plan:       Dmitri was seen today for laceration.    Diagnoses and all orders for this visit:    Laceration without foreign body of right hand, initial encounter    Other orders  -     mupirocin (BACTROBAN) 2 % ointment; Apply to affected area 3 times daily          Patient Instructions   Go to the Emergency Room if symptoms or condition worsens in any way    Return to Clinic for Suture Removal in 10 days      Extremity Laceration: Sutures, Staples, or Tape  A laceration is a cut through the skin. If it is deep, it may require stitches (sutures) or staples to close so it can heal. Minor cuts may be treated with surgical tape closures.   X-rays may be done if something may have entered the skin through the cut. You may also need a tetanus shot if you are not up to date on this vaccination.  Home care  · Follow the health care providers instructions on how to care for the cut.  · Wash your hands with soap and warm water before and after caring for your wound. This is to help prevent infection.  · Keep the wound clean and dry. If a bandage was applied and it becomes wet or dirty, replace it. Otherwise, leave it in place for the  first 24 hours, then change it once a day or as directed.  · If sutures or staples were used, clean the wound daily:  · After removing the bandage, wash the area with soap and water. Use a wet cotton swab to loosen and remove any blood or crust that forms.  · After cleaning, keep the wound clean and dry. Talk with your doctor before applying any antibiotic ointment to the wound. Reapply the bandage.  · You may remove the bandage to shower as usual after the first 24 hours, but do not soak the area in water (no swimming) until the stitches or staples are removed.  · If surgical tape closures were used, keep the area clean and dry. If it becomes wet, blot it dry with a towel.  · The doctor may prescribe an antibiotic cream or ointment to prevent infection. Do not stop taking this medication until you have finished the prescribed course or the doctor tells you to stop. The doctor may also prescribe medications for pain. Follow the doctors instructions for taking these medications.  · Avoid activities that may reopen your wound.  Follow-up care  Follow up with your health care provider. Most skin wounds heal within ten days. However, an infection may sometimes occur despite proper treatment. Therefore, check the wound daily for the signs of infection listed below. Stitches and staples should be removed within 7-14 days. If surgical tape closures were used, you may remove them after 10 days if they have not fallen off by then.   When to seek medical advice  Call your health care provider right away if any of these occur:  · Wound bleeding not controlled by direct pressure  · Signs of infection, including increasing pain in the wound, increasing wound redness or swelling, or pus or bad odor coming from the wound  · Fever of 100.4°F (38ºC) or higher or as directed by your healthcare provider  · Stitches or staples come apart or fall out or surgical tape falls off before 7 days  · Wound edges re-open  · Wound changes  colors  · Numbness around the wound   · Decreased movement around the injured area  Date Last Reviewed: 6/14/2015  © 9425-3167 [employer], [employer]. [address] [place] [address], [place] [address]. All rights reserved. This information is not intended as a substitute for professional medical care. Always follow your healthcare professional's instructions.

## 2019-10-17 ENCOUNTER — CLINICAL SUPPORT (OUTPATIENT)
Dept: URGENT CARE | Facility: CLINIC | Age: 59
End: 2019-10-17
Payer: COMMERCIAL

## 2019-10-17 VITALS
WEIGHT: 260 LBS | TEMPERATURE: 98 F | DIASTOLIC BLOOD PRESSURE: 84 MMHG | RESPIRATION RATE: 18 BRPM | BODY MASS INDEX: 30.7 KG/M2 | HEART RATE: 68 BPM | HEIGHT: 77 IN | SYSTOLIC BLOOD PRESSURE: 133 MMHG | OXYGEN SATURATION: 99 %

## 2019-10-17 DIAGNOSIS — Z48.02 VISIT FOR SUTURE REMOVAL: Primary | ICD-10-CM

## 2019-10-17 PROCEDURE — 99024 POSTOP FOLLOW-UP VISIT: CPT | Mod: S$GLB,,, | Performed by: EMERGENCY MEDICINE

## 2019-10-17 PROCEDURE — 99024 PR POST-OP FOLLOW-UP VISIT: ICD-10-PCS | Mod: S$GLB,,, | Performed by: EMERGENCY MEDICINE

## 2019-10-17 NOTE — PATIENT INSTRUCTIONS
"  Return to Clinic for worsening symptoms or signs of infection      Suture or Staple Removal  You were seen today for a suture or staple removal. Your wound is healing as expected. The wound has healed well enough that the sutures or staples can be removed. The wound will continue to heal for the next few months.  At this time there is no sign of infection.   Home care  · If you have pain, take pain medicine as advised by your healthcare provider.   · Keep your wound clean and protected by covering it with a bandage for the next week or so.   · Wash your hands with soap and warm water before and after caring for your wound. This helps prevent infection.  · Clean the wound gently with soap and warm water daily or as directed by your childs health care provider. Do not use iodine, alcohol, or other cleansers on the wound.  Gently pat it dry. Put on a new bandage, if needed. Do not reuse bandages.  · If the area gets wet, gently pat it dry with a clean cloth. Replace the wet bandage with a dry one.  · Check the wound daily for signs of infection. (These are listed under "When to seek medical advice" below.)  · You may shower and bathe as usual. Swimming is now permitted.  Follow-up care  Follow up with your healthcare provider as advised.  When to seek medical advice   Call your healthcare provider if any of the following occur:  · Wound reopens or bleeds  · Signs of an infection, such as:  ¨ Increasing redness or swelling around the wound  ¨ Increased warmth from the wound  ¨ Worsening pain  ¨ Red streaking lines away from the wound  ¨ Fluid draining from the wound  · Fever of 100.4°F (38°C) or higher, or as directed by your child's healthcare provider  Date Last Reviewed: 9/27/2015  © 8519-0011 [employer]. [address] [place] [address], [place] [address]. All rights reserved. This information is not intended as a substitute for professional medical care. Always follow your healthcare professional's " instructions.

## 2019-10-17 NOTE — PROGRESS NOTES
"Subjective:       Patient ID: Dmitri Watt is a 59 y.o. male.    Vitals:    10/17/19 1313   BP: 133/84   Pulse: 68   Resp: 18   Temp: 98 °F (36.7 °C)   SpO2: 99%   Weight: 117.9 kg (260 lb)   Height: 6' 5" (1.956 m)       Chief Complaint: Suture / Staple Removal    Suture / Staple Removal   The sutures were placed 7 to 10 days ago. He tried regular soap and water washings and oral antibiotics since the wound repair. The treatment provided moderate relief. His temperature was unmeasured prior to arrival. There has been no drainage from the wound. There is no redness present. The swelling has improved. The pain has improved. He has no difficulty moving the affected extremity or digit.     Review of Systems   Constitution: Negative for fever.   Eyes: Negative for discharge.   Skin: Negative for unusual hair distribution.       Objective:      Physical Exam   Constitutional: He is oriented to person, place, and time. He appears well-developed and well-nourished.   HENT:   Head: Normocephalic and atraumatic. Head is without abrasion, without contusion and without laceration.   Right Ear: External ear normal.   Left Ear: External ear normal.   Nose: Nose normal.   Mouth/Throat: Oropharynx is clear and moist.   Eyes: Pupils are equal, round, and reactive to light. Conjunctivae, EOM and lids are normal.   Neck: Trachea normal, full passive range of motion without pain and phonation normal. Neck supple.   Cardiovascular: Normal rate, regular rhythm and normal heart sounds.   Pulmonary/Chest: Effort normal and breath sounds normal. No stridor. No respiratory distress.   Musculoskeletal: Normal range of motion.   Neurological: He is alert and oriented to person, place, and time.   Skin: Skin is warm and dry. Capillary refill takes less than 2 seconds. Laceration noted. No abrasion, no bruising, no burn, no ecchymosis, no lesion and no rash noted. No erythema.   Psychiatric: He has a normal mood and affect. His speech is " "normal and behavior is normal. Judgment and thought content normal. Cognition and memory are normal.   Nursing note and vitals reviewed.      Assessment:       1. Visit for suture removal        Plan:       Dmitri was seen today for suture / staple removal.    Diagnoses and all orders for this visit:    Visit for suture removal        Wound Recheck    Suture   Removal    Wound Location: Right hand    Wound Healing Well    No signs of infection    No drainage    8 sutures     Removed Total    Patient has a several remaining Vicryl stitches between his finger which he will leave in place    Patient Instructions     Return to Clinic for worsening symptoms or signs of infection      Suture or Staple Removal  You were seen today for a suture or staple removal. Your wound is healing as expected. The wound has healed well enough that the sutures or staples can be removed. The wound will continue to heal for the next few months.  At this time there is no sign of infection.   Home care  · If you have pain, take pain medicine as advised by your healthcare provider.   · Keep your wound clean and protected by covering it with a bandage for the next week or so.   · Wash your hands with soap and warm water before and after caring for your wound. This helps prevent infection.  · Clean the wound gently with soap and warm water daily or as directed by your childs health care provider. Do not use iodine, alcohol, or other cleansers on the wound.  Gently pat it dry. Put on a new bandage, if needed. Do not reuse bandages.  · If the area gets wet, gently pat it dry with a clean cloth. Replace the wet bandage with a dry one.  · Check the wound daily for signs of infection. (These are listed under "When to seek medical advice" below.)  · You may shower and bathe as usual. Swimming is now permitted.  Follow-up care  Follow up with your healthcare provider as advised.  When to seek medical advice   Call your healthcare provider if any of " the following occur:  · Wound reopens or bleeds  · Signs of an infection, such as:  ¨ Increasing redness or swelling around the wound  ¨ Increased warmth from the wound  ¨ Worsening pain  ¨ Red streaking lines away from the wound  ¨ Fluid draining from the wound  · Fever of 100.4°F (38°C) or higher, or as directed by your child's healthcare provider  Date Last Reviewed: 9/27/2015  © 0789-6486 [employer]. [address] [place] [address], [place] [address]. All rights reserved. This information is not intended as a substitute for professional medical care. Always follow your healthcare professional's instructions.

## 2019-11-01 ENCOUNTER — OFFICE VISIT (OUTPATIENT)
Dept: UROLOGY | Facility: CLINIC | Age: 59
End: 2019-11-01
Payer: COMMERCIAL

## 2019-11-01 VITALS
HEART RATE: 67 BPM | WEIGHT: 264.56 LBS | SYSTOLIC BLOOD PRESSURE: 125 MMHG | DIASTOLIC BLOOD PRESSURE: 86 MMHG | BODY MASS INDEX: 31.24 KG/M2 | HEIGHT: 77 IN

## 2019-11-01 DIAGNOSIS — R31.29 HEMATURIA, MICROSCOPIC: ICD-10-CM

## 2019-11-01 DIAGNOSIS — C61 PROSTATE CANCER: Primary | ICD-10-CM

## 2019-11-01 DIAGNOSIS — R39.12 WEAK URINE STREAM: ICD-10-CM

## 2019-11-01 DIAGNOSIS — N52.9 ERECTILE DYSFUNCTION, UNSPECIFIED ERECTILE DYSFUNCTION TYPE: ICD-10-CM

## 2019-11-01 PROCEDURE — 88112 CYTOPATH CELL ENHANCE TECH: CPT | Performed by: PATHOLOGY

## 2019-11-01 PROCEDURE — 99999 PR PBB SHADOW E&M-EST. PATIENT-LVL III: ICD-10-PCS | Mod: PBBFAC,,, | Performed by: UROLOGY

## 2019-11-01 PROCEDURE — 3008F PR BODY MASS INDEX (BMI) DOCUMENTED: ICD-10-PCS | Mod: CPTII,S$GLB,, | Performed by: UROLOGY

## 2019-11-01 PROCEDURE — 99999 PR PBB SHADOW E&M-EST. PATIENT-LVL III: CPT | Mod: PBBFAC,,, | Performed by: UROLOGY

## 2019-11-01 PROCEDURE — 99214 OFFICE O/P EST MOD 30 MIN: CPT | Mod: S$GLB,,, | Performed by: UROLOGY

## 2019-11-01 PROCEDURE — 99214 PR OFFICE/OUTPT VISIT, EST, LEVL IV, 30-39 MIN: ICD-10-PCS | Mod: S$GLB,,, | Performed by: UROLOGY

## 2019-11-01 PROCEDURE — 3008F BODY MASS INDEX DOCD: CPT | Mod: CPTII,S$GLB,, | Performed by: UROLOGY

## 2019-11-01 PROCEDURE — 88112 CYTOLOGY SPECIMEN-URINE: ICD-10-PCS | Mod: 26,,, | Performed by: PATHOLOGY

## 2019-11-01 RX ORDER — TADALAFIL 20 MG/1
20 TABLET ORAL
Qty: 10 TABLET | Refills: 12 | Status: SHIPPED | OUTPATIENT
Start: 2019-11-01 | End: 2021-02-03

## 2019-11-01 NOTE — PROGRESS NOTES
CC:  Second opinion    Dmitri Watt is a 59 y.o. man who is here for the discussion of prostate cancer and other urologic issues  S/p RALP by Dr. Martin on 10/1/2018.  He was told that his pathology revealed positive margin.  His PSA has been undetectable since his surgery.  However, his PSA has slightly increased and noted to be 0.02 on 10/2019.  He was seen by Dr. Camejo for prostate progression.  He was recommended to follow up with serial PSA every 2 months.  He has been taking finasteride for his hair loss until recently.  He now stopped finasteride.    C/o increased nocturia 1 to 2 x, incomplete bladder emptying, mild PHILIP  He quit using a pad since 5/2019.  He noticed 70 % of his erection since his surgery.    Past Medical History:   Diagnosis Date    Elevated PSA      Past Surgical History:   Procedure Laterality Date    none       Social History     Tobacco Use    Smoking status: Never Smoker    Smokeless tobacco: Never Used   Substance Use Topics    Alcohol use: Yes     Comment: socially    Drug use: No     Family History   Problem Relation Age of Onset    Cancer Maternal Aunt         colon    Benign prostatic hyperplasia Father     Cancer Mother      Allergy:  Review of patient's allergies indicates:   Allergen Reactions    No known drug allergies      Outpatient Encounter Medications as of 11/1/2019   Medication Sig Dispense Refill    glucosamine-chondroitin 500-400 mg tablet Take 1 tablet by mouth 3 (three) times daily.      ibuprofen (ADVIL,MOTRIN) 100 MG tablet Take 100 mg by mouth every 6 (six) hours as needed.      multivitamin capsule Take 1 capsule by mouth once daily.      VIAGRA 100 mg tablet TAKE 1 TABLET(100 MG) BY MOUTH EVERY DAY 5 tablet 0    finasteride (PROSCAR) 5 mg tablet TAKE 1 TABLET(5 MG) BY MOUTH EVERY DAY (Patient not taking: Reported on 11/1/2019) 90 tablet 0    mupirocin (BACTROBAN) 2 % ointment Apply to affected area 3 times daily (Patient not taking: Reported on  11/1/2019) 22 g 1    tadalafil (CIALIS) 20 MG Tab Take 1 tablet (20 mg total) by mouth as needed. 10 tablet 12    tamsulosin (FLOMAX) 0.4 mg Cap TAKE 1 CAPSULE(0.4 MG) BY MOUTH EVERY EVENING (Patient not taking: Reported on 11/1/2019) 90 capsule 0     Facility-Administered Encounter Medications as of 11/1/2019   Medication Dose Route Frequency Provider Last Rate Last Dose    gentamicin injection 160 mg  160 mg Intramuscular Once Raheel H. MD Don        lidocaine HCL 10 mg/ml (1%) injection 10 mL  10 mL Infiltration Once Raheel H. MD Don        lidocaine HCL 2% jelly   Topical (Top) Once Raheel H. MD Don         Review of Systems   General ROS: GENERAL:  No weight gain or loss  SKIN:  No rashes or lacerations  HEAD:  No headaches  EYES:  No exophthalmos, jaundice or blindness  EARS:  No dizziness, tinnitus or hearing loss  NOSE:  No changes in smell  MOUTH & THROAT:  No dyskinetic movements or obvious goiter  CHEST:  No shortness of breath, hyperventilation or cough  CARDIOVASCULAR:  No tachycardia or chest pain  ABDOMEN:  No nausea, vomiting, pain, constipation or diarrhea  URINARY:  No frequency, dysuria or sexual dysfunction  ENDOCRINE:  No polydipsia, polyuria  MUSCULOSKELETAL:  No pain or stiffness of the joints  NEUROLOGIC:  No weakness, sensory changes, seizures, confusion, memory loss, tremor or other abnormal movements  Physical Exam     Vitals:    11/01/19 0825   BP: 125/86   Pulse: 67     General Appearance:  Alert, cooperative, no distress, appears stated age   Head:  Normocephalic, without obvious abnormality, atraumatic   Eyes:  PERRL, conjunctiva/corneas clear, EOM's intact, fundi benign, both eyes   Ears:  Normal TM's and external ear canals, both ears   Nose: Nares normal, septum midline, mucosa normal, no drainage or sinus tenderness   Throat: Lips, mucosa, and tongue normal; teeth and gums normal   Neck: Supple, symmetrical, trachea midline, no adenopathy, thyroid: not enlarged, symmetric,  no tenderness/mass/nodules, no carotid bruit or JVD   Back:   Symmetric, no curvature, ROM normal, no CVA tenderness   Lungs:   Clear to auscultation bilaterally, respirations unlabored   Chest Wall:  No tenderness or deformity   Heart:  Regular rate and rhythm, S1, S2 normal, no murmur, rub or gallop   Abdomen:   Soft, non-tender, bowel sounds active all four quadrants,  no masses, no organomegaly of liver and spleen  No hernia noted   Genitalia:  Scrotum: no rash or lesion  Normal symmetric epididymis without masses  Normal vas palpated  Normal size, symmetric testicles with no masses   Normal urethral meatus with no discharge  Normal circumcised penis with no lesion   Rectal:  Normal perineum and anus upon inspection.  Normal tone, no masses or tenderness;   Extremities: Extremities normal, atraumatic, no cyanosis or edema   Pulses: 2+ and symmetric   Skin: Skin color, texture, turgor normal, no rashes or lesions   Lymph nodes: Cervical, supraclavicular, and axillary nodes normal   Neurologic: Normal     Prostate absent.    LABS:  Lab Results   Component Value Date    PSA 6.3 (H) 11/08/2013    PSA 4.81 (H) 05/21/2012    PSA 4.72 (H) 03/09/2012    PSA 5.33 (H) 12/21/2011    PSA 2.9 01/21/2009    PSADIAG 13.4 (H) 05/31/2018    PSADIAG 10.9 (H) 03/05/2018    PSADIAG 8.3 (H) 10/19/2017    PSADIAG 8.3 (H) 07/20/2017    PSADIAG 6.2 (H) 02/02/2017    PSADIAG 8.7 (H) 12/05/2016    PSADIAG 6.4 (H) 12/29/2015    PSADIAG 4.5 (H) 06/01/2015    PSADIAG 4.0 11/04/2014    PSADIAG 5.6 (H) 01/27/2014       Lab Results   Component Value Date    CREATININE 1.3 06/26/2017    CREATININE 1.3 11/04/2014    CREATININE 1.5 (H) 11/08/2013     Results for orders placed or performed in visit on 11/04/14   Testosterone   Result Value Ref Range    Testosterone, Total 575 195.0-1138.0 ng/dL     UA trace blood  50 +    Assessment and Plan:  Dmitri was seen today for testaglia and urinary frequency.    Diagnoses and all orders for this  visit:    Prostate cancer    Erectile dysfunction, unspecified erectile dysfunction type  -     tadalafil (CIALIS) 20 MG Tab; Take 1 tablet (20 mg total) by mouth as needed.    Hematuria, microscopic  -     Cytology, urine    Weak urine stream      Discussed extensively regarding his prostate cancer and recurring PSA.  Recommend to follow up with Dr. Camejo.  May be OK as long as his PSA remains less 0.2.  Watch out for his PSA doubling time.    For his ED, he has take generic viagra 200 mg dose.  He would like to try Cialis.    For his voiding problems, he can continue to do Kegel exercise.  He got to learn to relax his sphincter when he voids  All questions answered.  May consider cysto first if he desires.  Over 1 hour counseling done regarding the second opinion consultation.    Follow-up:  Follow up if symptoms worsen or fail to improve.

## 2020-12-18 ENCOUNTER — CLINICAL SUPPORT (OUTPATIENT)
Dept: URGENT CARE | Facility: CLINIC | Age: 60
End: 2020-12-18
Payer: COMMERCIAL

## 2020-12-18 VITALS — TEMPERATURE: 99 F

## 2020-12-18 DIAGNOSIS — Z23 FLU VACCINE NEED: Primary | ICD-10-CM

## 2020-12-18 PROCEDURE — 90471 IMMUNIZATION ADMIN: CPT | Mod: S$GLB,,, | Performed by: FAMILY MEDICINE

## 2020-12-18 PROCEDURE — 90686 IIV4 VACC NO PRSV 0.5 ML IM: CPT | Mod: S$GLB,,, | Performed by: FAMILY MEDICINE

## 2020-12-18 PROCEDURE — 90471 FLU VACCINE (QUAD) GREATER THAN OR EQUAL TO 3YO PRESERVATIVE FREE IM: ICD-10-PCS | Mod: S$GLB,,, | Performed by: FAMILY MEDICINE

## 2020-12-18 PROCEDURE — 90686 FLU VACCINE (QUAD) GREATER THAN OR EQUAL TO 3YO PRESERVATIVE FREE IM: ICD-10-PCS | Mod: S$GLB,,, | Performed by: FAMILY MEDICINE

## 2021-01-25 ENCOUNTER — TELEPHONE (OUTPATIENT)
Dept: INTERNAL MEDICINE | Facility: CLINIC | Age: 61
End: 2021-01-25

## 2021-01-28 ENCOUNTER — OFFICE VISIT (OUTPATIENT)
Dept: INTERNAL MEDICINE | Facility: CLINIC | Age: 61
End: 2021-01-28
Payer: COMMERCIAL

## 2021-01-28 VITALS
DIASTOLIC BLOOD PRESSURE: 62 MMHG | BODY MASS INDEX: 30.35 KG/M2 | WEIGHT: 257.06 LBS | SYSTOLIC BLOOD PRESSURE: 110 MMHG | HEART RATE: 61 BPM | TEMPERATURE: 97 F | HEIGHT: 77 IN | OXYGEN SATURATION: 98 %

## 2021-01-28 DIAGNOSIS — C61 PROSTATE CANCER: Primary | ICD-10-CM

## 2021-01-28 DIAGNOSIS — N52.31 ERECTILE DYSFUNCTION AFTER RADICAL PROSTATECTOMY: ICD-10-CM

## 2021-01-28 DIAGNOSIS — E78.5 DYSLIPIDEMIA: ICD-10-CM

## 2021-01-28 DIAGNOSIS — R42 LIGHTHEADEDNESS: ICD-10-CM

## 2021-01-28 DIAGNOSIS — Z00.00 HEALTH MAINTENANCE EXAMINATION: ICD-10-CM

## 2021-01-28 PROCEDURE — 93005 ELECTROCARDIOGRAM TRACING: CPT | Mod: S$GLB,,, | Performed by: STUDENT IN AN ORGANIZED HEALTH CARE EDUCATION/TRAINING PROGRAM

## 2021-01-28 PROCEDURE — 99999 PR PBB SHADOW E&M-EST. PATIENT-LVL IV: ICD-10-PCS | Mod: PBBFAC,,, | Performed by: STUDENT IN AN ORGANIZED HEALTH CARE EDUCATION/TRAINING PROGRAM

## 2021-01-28 PROCEDURE — 90670 PNEUMOCOCCAL CONJUGATE VACCINE 13-VALENT LESS THAN 5YO & GREATER THAN: ICD-10-PCS | Mod: S$GLB,,, | Performed by: INTERNAL MEDICINE

## 2021-01-28 PROCEDURE — 90471 PNEUMOCOCCAL CONJUGATE VACCINE 13-VALENT LESS THAN 5YO & GREATER THAN: ICD-10-PCS | Mod: S$GLB,,, | Performed by: INTERNAL MEDICINE

## 2021-01-28 PROCEDURE — 90471 IMMUNIZATION ADMIN: CPT | Mod: S$GLB,,, | Performed by: INTERNAL MEDICINE

## 2021-01-28 PROCEDURE — 99396 PR PREVENTIVE VISIT,EST,40-64: ICD-10-PCS | Mod: 25,S$GLB,, | Performed by: STUDENT IN AN ORGANIZED HEALTH CARE EDUCATION/TRAINING PROGRAM

## 2021-01-28 PROCEDURE — 99999 PR PBB SHADOW E&M-EST. PATIENT-LVL IV: CPT | Mod: PBBFAC,,, | Performed by: STUDENT IN AN ORGANIZED HEALTH CARE EDUCATION/TRAINING PROGRAM

## 2021-01-28 PROCEDURE — 99396 PREV VISIT EST AGE 40-64: CPT | Mod: 25,S$GLB,, | Performed by: STUDENT IN AN ORGANIZED HEALTH CARE EDUCATION/TRAINING PROGRAM

## 2021-01-28 PROCEDURE — 93010 EKG 12-LEAD: ICD-10-PCS | Mod: S$GLB,,, | Performed by: INTERNAL MEDICINE

## 2021-01-28 PROCEDURE — 90670 PCV13 VACCINE IM: CPT | Mod: S$GLB,,, | Performed by: INTERNAL MEDICINE

## 2021-01-28 PROCEDURE — 93010 ELECTROCARDIOGRAM REPORT: CPT | Mod: S$GLB,,, | Performed by: INTERNAL MEDICINE

## 2021-01-28 PROCEDURE — 93005 EKG 12-LEAD: ICD-10-PCS | Mod: S$GLB,,, | Performed by: STUDENT IN AN ORGANIZED HEALTH CARE EDUCATION/TRAINING PROGRAM

## 2021-01-29 ENCOUNTER — PATIENT MESSAGE (OUTPATIENT)
Dept: INTERNAL MEDICINE | Facility: CLINIC | Age: 61
End: 2021-01-29

## 2021-01-29 DIAGNOSIS — R42 EPISODIC LIGHTHEADEDNESS: ICD-10-CM

## 2021-01-29 DIAGNOSIS — R94.31 ABNORMAL ECG: Primary | ICD-10-CM

## 2021-02-03 ENCOUNTER — OFFICE VISIT (OUTPATIENT)
Dept: CARDIOLOGY | Facility: CLINIC | Age: 61
End: 2021-02-03
Payer: COMMERCIAL

## 2021-02-03 VITALS
DIASTOLIC BLOOD PRESSURE: 70 MMHG | SYSTOLIC BLOOD PRESSURE: 116 MMHG | HEART RATE: 60 BPM | WEIGHT: 257.06 LBS | BODY MASS INDEX: 30.35 KG/M2 | HEIGHT: 77 IN

## 2021-02-03 DIAGNOSIS — R94.31 ABNORMAL ECG: ICD-10-CM

## 2021-02-03 DIAGNOSIS — R42 EPISODIC LIGHTHEADEDNESS: ICD-10-CM

## 2021-02-03 DIAGNOSIS — I25.10 ATHEROSCLEROSIS OF NATIVE CORONARY ARTERY OF NATIVE HEART WITHOUT ANGINA PECTORIS: ICD-10-CM

## 2021-02-03 PROCEDURE — 3008F PR BODY MASS INDEX (BMI) DOCUMENTED: ICD-10-PCS | Mod: CPTII,S$GLB,, | Performed by: INTERNAL MEDICINE

## 2021-02-03 PROCEDURE — 99999 PR PBB SHADOW E&M-EST. PATIENT-LVL IV: CPT | Mod: PBBFAC,,, | Performed by: INTERNAL MEDICINE

## 2021-02-03 PROCEDURE — 3008F BODY MASS INDEX DOCD: CPT | Mod: CPTII,S$GLB,, | Performed by: INTERNAL MEDICINE

## 2021-02-03 PROCEDURE — 99214 OFFICE O/P EST MOD 30 MIN: CPT | Mod: S$GLB,,, | Performed by: INTERNAL MEDICINE

## 2021-02-03 PROCEDURE — 1126F AMNT PAIN NOTED NONE PRSNT: CPT | Mod: S$GLB,,, | Performed by: INTERNAL MEDICINE

## 2021-02-03 PROCEDURE — 99214 PR OFFICE/OUTPT VISIT, EST, LEVL IV, 30-39 MIN: ICD-10-PCS | Mod: S$GLB,,, | Performed by: INTERNAL MEDICINE

## 2021-02-03 PROCEDURE — 99999 PR PBB SHADOW E&M-EST. PATIENT-LVL IV: ICD-10-PCS | Mod: PBBFAC,,, | Performed by: INTERNAL MEDICINE

## 2021-02-03 PROCEDURE — 1126F PR PAIN SEVERITY QUANTIFIED, NO PAIN PRESENT: ICD-10-PCS | Mod: S$GLB,,, | Performed by: INTERNAL MEDICINE

## 2021-02-04 ENCOUNTER — LAB VISIT (OUTPATIENT)
Dept: LAB | Facility: HOSPITAL | Age: 61
End: 2021-02-04
Attending: INTERNAL MEDICINE
Payer: COMMERCIAL

## 2021-02-04 DIAGNOSIS — E78.5 DYSLIPIDEMIA: ICD-10-CM

## 2021-02-04 DIAGNOSIS — R42 LIGHTHEADEDNESS: ICD-10-CM

## 2021-02-04 LAB
BASOPHILS # BLD AUTO: 0.02 K/UL (ref 0–0.2)
BASOPHILS NFR BLD: 0.5 % (ref 0–1.9)
DIFFERENTIAL METHOD: ABNORMAL
EOSINOPHIL # BLD AUTO: 0.4 K/UL (ref 0–0.5)
EOSINOPHIL NFR BLD: 11.3 % (ref 0–8)
ERYTHROCYTE [DISTWIDTH] IN BLOOD BY AUTOMATED COUNT: 13.4 % (ref 11.5–14.5)
HCT VFR BLD AUTO: 41.6 % (ref 40–54)
HGB BLD-MCNC: 13.2 G/DL (ref 14–18)
IMM GRANULOCYTES # BLD AUTO: 0.01 K/UL (ref 0–0.04)
IMM GRANULOCYTES NFR BLD AUTO: 0.3 % (ref 0–0.5)
LYMPHOCYTES # BLD AUTO: 0.7 K/UL (ref 1–4.8)
LYMPHOCYTES NFR BLD: 18 % (ref 18–48)
MCH RBC QN AUTO: 30.9 PG (ref 27–31)
MCHC RBC AUTO-ENTMCNC: 31.7 G/DL (ref 32–36)
MCV RBC AUTO: 97 FL (ref 82–98)
MONOCYTES # BLD AUTO: 0.3 K/UL (ref 0.3–1)
MONOCYTES NFR BLD: 7.5 % (ref 4–15)
NEUTROPHILS # BLD AUTO: 2.3 K/UL (ref 1.8–7.7)
NEUTROPHILS NFR BLD: 62.4 % (ref 38–73)
NRBC BLD-RTO: 0 /100 WBC
PLATELET # BLD AUTO: 239 K/UL (ref 150–350)
PMV BLD AUTO: 8.9 FL (ref 9.2–12.9)
RBC # BLD AUTO: 4.27 M/UL (ref 4.6–6.2)
WBC # BLD AUTO: 3.73 K/UL (ref 3.9–12.7)

## 2021-02-04 PROCEDURE — 85025 COMPLETE CBC W/AUTO DIFF WBC: CPT

## 2021-02-04 PROCEDURE — 36415 COLL VENOUS BLD VENIPUNCTURE: CPT | Mod: PO

## 2021-02-04 PROCEDURE — 80061 LIPID PANEL: CPT

## 2021-02-04 PROCEDURE — 80053 COMPREHEN METABOLIC PANEL: CPT

## 2021-02-05 ENCOUNTER — HOSPITAL ENCOUNTER (OUTPATIENT)
Dept: CARDIOLOGY | Facility: HOSPITAL | Age: 61
Discharge: HOME | End: 2021-02-05
Attending: INTERNAL MEDICINE
Payer: COMMERCIAL

## 2021-02-05 VITALS — BODY MASS INDEX: 30.34 KG/M2 | HEIGHT: 77 IN | WEIGHT: 257 LBS

## 2021-02-05 DIAGNOSIS — R94.31 ABNORMAL ECG: ICD-10-CM

## 2021-02-05 LAB
ALBUMIN SERPL BCP-MCNC: 3.7 G/DL (ref 3.5–5.2)
ALP SERPL-CCNC: 68 U/L (ref 55–135)
ALT SERPL W/O P-5'-P-CCNC: 38 U/L (ref 10–44)
ANION GAP SERPL CALC-SCNC: 8 MMOL/L (ref 8–16)
ASCENDING AORTA: 3.67 CM
AST SERPL-CCNC: 26 U/L (ref 10–40)
BILIRUB SERPL-MCNC: 0.5 MG/DL (ref 0.1–1)
BSA FOR ECHO PROCEDURE: 2.52 M2
BUN SERPL-MCNC: 21 MG/DL (ref 6–20)
CALCIUM SERPL-MCNC: 9.2 MG/DL (ref 8.7–10.5)
CHLORIDE SERPL-SCNC: 106 MMOL/L (ref 95–110)
CHOLEST SERPL-MCNC: 240 MG/DL (ref 120–199)
CHOLEST/HDLC SERPL: 4.5 {RATIO} (ref 2–5)
CO2 SERPL-SCNC: 27 MMOL/L (ref 23–29)
CREAT SERPL-MCNC: 1.2 MG/DL (ref 0.5–1.4)
CV ECHO LV RWT: 0.39 CM
CV STRESS BASE HR: 61 BPM
DOP CALC LVOT AREA: 4.4 CM2
DOP CALC LVOT DIAMETER: 2.38 CM
DOP CALC LVOT PEAK VEL: 1.27 M/S
DOP CALC LVOT STROKE VOLUME: 126.86 CM3
DOP CALCLVOT PEAK VEL VTI: 28.53 CM
E WAVE DECELERATION TIME: 239.63 MSEC
E/A RATIO: 1.1
E/E' RATIO: 10 M/S
ECHO LV POSTERIOR WALL: 1.02 CM (ref 0.6–1.1)
EST. GFR  (AFRICAN AMERICAN): >60 ML/MIN/1.73 M^2
EST. GFR  (NON AFRICAN AMERICAN): >60 ML/MIN/1.73 M^2
FRACTIONAL SHORTENING: 34 % (ref 28–44)
GLUCOSE SERPL-MCNC: 103 MG/DL (ref 70–110)
HDLC SERPL-MCNC: 53 MG/DL (ref 40–75)
HDLC SERPL: 22.1 % (ref 20–50)
INTERVENTRICULAR SEPTUM: 1.13 CM (ref 0.6–1.1)
IVRT: 131.3 MSEC
LA MAJOR: 5.23 CM
LA MINOR: 5 CM
LA WIDTH: 3.83 CM
LDLC SERPL CALC-MCNC: 168.2 MG/DL (ref 63–159)
LEFT ATRIUM SIZE: 3.6 CM
LEFT ATRIUM VOLUME INDEX MOD: 20 ML/M2
LEFT ATRIUM VOLUME INDEX: 24.1 ML/M2
LEFT ATRIUM VOLUME MOD: 49.92 CM3
LEFT ATRIUM VOLUME: 59.92 CM3
LEFT INTERNAL DIMENSION IN SYSTOLE: 3.45 CM (ref 2.1–4)
LEFT VENTRICLE DIASTOLIC VOLUME INDEX: 51.89 ML/M2
LEFT VENTRICLE DIASTOLIC VOLUME: 129.2 ML
LEFT VENTRICLE MASS INDEX: 86 G/M2
LEFT VENTRICLE SYSTOLIC VOLUME INDEX: 19.7 ML/M2
LEFT VENTRICLE SYSTOLIC VOLUME: 49.04 ML
LEFT VENTRICULAR INTERNAL DIMENSION IN DIASTOLE: 5.19 CM (ref 3.5–6)
LEFT VENTRICULAR MASS: 213.3 G
LV LATERAL E/E' RATIO: 7.27 M/S
LV SEPTAL E/E' RATIO: 16 M/S
MV PEAK A VEL: 0.73 M/S
MV PEAK E VEL: 0.8 M/S
NONHDLC SERPL-MCNC: 187 MG/DL
OHS CV CPX 1 MINUTE RECOVERY HEART RATE: 106 BPM
OHS CV CPX 85 PERCENT MAX PREDICTED HEART RATE MALE: 137
OHS CV CPX ESTIMATED METS: 10
OHS CV CPX MAX PREDICTED HEART RATE: 161
OHS CV CPX PATIENT IS FEMALE: 0
OHS CV CPX PATIENT IS MALE: 1
OHS CV CPX PEAK HEAR RATE: 144 BPM
OHS CV CPX PERCENT MAX PREDICTED HEART RATE ACHIEVED: 89
POTASSIUM SERPL-SCNC: 4.7 MMOL/L (ref 3.5–5.1)
PROT SERPL-MCNC: 6.7 G/DL (ref 6–8.4)
PULM VEIN S/D RATIO: 0.96
PV PEAK D VEL: 0.49 M/S
PV PEAK S VEL: 0.47 M/S
RA MAJOR: 5.4 CM
RA PRESSURE: 3 MMHG
RA WIDTH: 3.23 CM
RIGHT VENTRICULAR END-DIASTOLIC DIMENSION: 3.91 CM
SINUS: 3.89 CM
SODIUM SERPL-SCNC: 141 MMOL/L (ref 136–145)
STJ: 3.39 CM
STRESS ECHO POST EXERCISE DUR MIN: 6 MINUTES
STRESS ECHO POST EXERCISE DUR SEC: 29 SECONDS
TDI LATERAL: 0.11 M/S
TDI SEPTAL: 0.05 M/S
TDI: 0.08 M/S
TRICUSPID ANNULAR PLANE SYSTOLIC EXCURSION: 2.01 CM
TRIGL SERPL-MCNC: 94 MG/DL (ref 30–150)

## 2021-02-05 PROCEDURE — 93351 STRESS TTE COMPLETE: CPT | Mod: 26,,, | Performed by: INTERNAL MEDICINE

## 2021-02-05 PROCEDURE — 93351 STRESS TTE COMPLETE: CPT

## 2021-02-05 PROCEDURE — 93351 STRESS ECHO (CUPID ONLY): ICD-10-PCS | Mod: 26,,, | Performed by: INTERNAL MEDICINE

## 2021-02-09 ENCOUNTER — HOSPITAL ENCOUNTER (OUTPATIENT)
Dept: RADIOLOGY | Facility: HOSPITAL | Age: 61
Discharge: HOME | End: 2021-02-09
Attending: INTERNAL MEDICINE
Payer: COMMERCIAL

## 2021-02-09 DIAGNOSIS — I25.10 ATHEROSCLEROSIS OF NATIVE CORONARY ARTERY OF NATIVE HEART WITHOUT ANGINA PECTORIS: ICD-10-CM

## 2021-02-09 PROCEDURE — 75571 CT HRT W/O DYE W/CA TEST: CPT | Mod: TC

## 2021-02-09 PROCEDURE — 75571 CT HRT W/O DYE W/CA TEST: CPT | Mod: 26,,, | Performed by: RADIOLOGY

## 2021-02-09 PROCEDURE — 75571 CT CALCIUM SCORING CARDIAC: ICD-10-PCS | Mod: 26,,, | Performed by: RADIOLOGY

## 2021-02-24 ENCOUNTER — OFFICE VISIT (OUTPATIENT)
Dept: OTOLARYNGOLOGY | Facility: CLINIC | Age: 61
End: 2021-02-24
Payer: COMMERCIAL

## 2021-02-24 DIAGNOSIS — R42 DIZZINESS: ICD-10-CM

## 2021-02-24 DIAGNOSIS — R42 LIGHTHEADEDNESS: Primary | ICD-10-CM

## 2021-02-24 DIAGNOSIS — H61.22 IMPACTED CERUMEN OF LEFT EAR: ICD-10-CM

## 2021-02-24 PROCEDURE — 1126F AMNT PAIN NOTED NONE PRSNT: CPT | Mod: S$GLB,,, | Performed by: NURSE PRACTITIONER

## 2021-02-24 PROCEDURE — 69210 EAR CERUMEN REMOVAL: ICD-10-PCS | Mod: S$GLB,,, | Performed by: NURSE PRACTITIONER

## 2021-02-24 PROCEDURE — 1126F PR PAIN SEVERITY QUANTIFIED, NO PAIN PRESENT: ICD-10-PCS | Mod: S$GLB,,, | Performed by: NURSE PRACTITIONER

## 2021-02-24 PROCEDURE — 99203 PR OFFICE/OUTPT VISIT, NEW, LEVL III, 30-44 MIN: ICD-10-PCS | Mod: 25,S$GLB,, | Performed by: NURSE PRACTITIONER

## 2021-02-24 PROCEDURE — 99999 PR PBB SHADOW E&M-EST. PATIENT-LVL III: ICD-10-PCS | Mod: PBBFAC,,, | Performed by: NURSE PRACTITIONER

## 2021-02-24 PROCEDURE — 99999 PR PBB SHADOW E&M-EST. PATIENT-LVL III: CPT | Mod: PBBFAC,,, | Performed by: NURSE PRACTITIONER

## 2021-02-24 PROCEDURE — 99203 OFFICE O/P NEW LOW 30 MIN: CPT | Mod: 25,S$GLB,, | Performed by: NURSE PRACTITIONER

## 2021-02-24 PROCEDURE — 69210 REMOVE IMPACTED EAR WAX UNI: CPT | Mod: S$GLB,,, | Performed by: NURSE PRACTITIONER

## 2021-05-03 PROBLEM — Z00.00 HEALTH MAINTENANCE EXAMINATION: Status: RESOLVED | Noted: 2021-01-28 | Resolved: 2021-05-03

## 2021-06-08 ENCOUNTER — PATIENT MESSAGE (OUTPATIENT)
Dept: INTERNAL MEDICINE | Facility: CLINIC | Age: 61
End: 2021-06-08

## 2021-07-27 NOTE — TELEPHONE ENCOUNTER
I spoke with patient and advised him that I sent his prolaris request to [facility] last week and will call him when the results are in about 3-4 weeks and will forward the results to , patient agreed  
What Type Of Note Output Would You Prefer (Optional)?: Standard Output
Hpi Title: Evaluation of Skin Lesions
How Severe Are Your Spot(S)?: mild
Have Your Spot(S) Been Treated In The Past?: has not been treated

## 2021-12-04 ENCOUNTER — OFFICE VISIT (OUTPATIENT)
Dept: URGENT CARE | Facility: CLINIC | Age: 61
End: 2021-12-04
Payer: COMMERCIAL

## 2021-12-04 VITALS
TEMPERATURE: 102 F | HEIGHT: 77 IN | WEIGHT: 257 LBS | HEART RATE: 61 BPM | SYSTOLIC BLOOD PRESSURE: 142 MMHG | DIASTOLIC BLOOD PRESSURE: 86 MMHG | RESPIRATION RATE: 16 BRPM | BODY MASS INDEX: 30.34 KG/M2 | OXYGEN SATURATION: 96 %

## 2021-12-04 DIAGNOSIS — J10.1 INFLUENZA A: Primary | ICD-10-CM

## 2021-12-04 DIAGNOSIS — Z11.59 ENCOUNTER FOR SCREENING FOR OTHER VIRAL DISEASES: ICD-10-CM

## 2021-12-04 DIAGNOSIS — R05.9 COUGH: ICD-10-CM

## 2021-12-04 LAB
CTP QC/QA: YES
CTP QC/QA: YES
POC MOLECULAR INFLUENZA A AGN: POSITIVE
POC MOLECULAR INFLUENZA B AGN: NEGATIVE
SARS-COV-2 RDRP RESP QL NAA+PROBE: NEGATIVE

## 2021-12-04 PROCEDURE — 99213 PR OFFICE/OUTPT VISIT, EST, LEVL III, 20-29 MIN: ICD-10-PCS | Mod: S$GLB,,, | Performed by: NURSE PRACTITIONER

## 2021-12-04 PROCEDURE — 87502 POCT INFLUENZA A/B MOLECULAR: ICD-10-PCS | Mod: QW,S$GLB,, | Performed by: NURSE PRACTITIONER

## 2021-12-04 PROCEDURE — 99213 OFFICE O/P EST LOW 20 MIN: CPT | Mod: S$GLB,,, | Performed by: NURSE PRACTITIONER

## 2021-12-04 PROCEDURE — U0002 COVID-19 LAB TEST NON-CDC: HCPCS | Mod: QW,S$GLB,, | Performed by: NURSE PRACTITIONER

## 2021-12-04 PROCEDURE — U0002: ICD-10-PCS | Mod: QW,S$GLB,, | Performed by: NURSE PRACTITIONER

## 2021-12-04 PROCEDURE — 87502 INFLUENZA DNA AMP PROBE: CPT | Mod: QW,S$GLB,, | Performed by: NURSE PRACTITIONER

## 2021-12-04 RX ORDER — OSELTAMIVIR PHOSPHATE 75 MG/1
75 CAPSULE ORAL 2 TIMES DAILY
Qty: 10 CAPSULE | Refills: 0 | Status: SHIPPED | OUTPATIENT
Start: 2021-12-04 | End: 2021-12-09